# Patient Record
Sex: MALE | Race: WHITE | Employment: OTHER | URBAN - METROPOLITAN AREA
[De-identification: names, ages, dates, MRNs, and addresses within clinical notes are randomized per-mention and may not be internally consistent; named-entity substitution may affect disease eponyms.]

---

## 2022-05-25 ENCOUNTER — HOSPITAL ENCOUNTER (EMERGENCY)
Age: 47
Discharge: HOME | End: 2022-05-25
Payer: COMMERCIAL

## 2022-05-25 ENCOUNTER — APPOINTMENT (OUTPATIENT)
Dept: GENERAL RADIOLOGY | Age: 47
End: 2022-05-25
Payer: COMMERCIAL

## 2022-05-25 VITALS
DIASTOLIC BLOOD PRESSURE: 87 MMHG | SYSTOLIC BLOOD PRESSURE: 123 MMHG | RESPIRATION RATE: 17 BRPM | BODY MASS INDEX: 32.14 KG/M2 | OXYGEN SATURATION: 98 % | WEIGHT: 200 LBS | HEART RATE: 88 BPM | HEIGHT: 66 IN | TEMPERATURE: 99.2 F

## 2022-05-25 DIAGNOSIS — S92.354A CLOSED NONDISPLACED FRACTURE OF FIFTH METATARSAL BONE OF RIGHT FOOT, INITIAL ENCOUNTER: Primary | ICD-10-CM

## 2022-05-25 PROCEDURE — 99283 EMERGENCY DEPT VISIT LOW MDM: CPT

## 2022-05-25 PROCEDURE — 73630 X-RAY EXAM OF FOOT: CPT

## 2022-05-25 RX ORDER — IBUPROFEN 600 MG/1
600 TABLET ORAL EVERY 8 HOURS PRN
Qty: 20 TABLET | Refills: 0 | Status: SHIPPED | OUTPATIENT
Start: 2022-05-25

## 2022-05-25 ASSESSMENT — PAIN DESCRIPTION - LOCATION: LOCATION: FOOT

## 2022-05-25 ASSESSMENT — PAIN - FUNCTIONAL ASSESSMENT
PAIN_FUNCTIONAL_ASSESSMENT: 0-10
PAIN_FUNCTIONAL_ASSESSMENT: NONE - DENIES PAIN

## 2022-05-25 ASSESSMENT — ENCOUNTER SYMPTOMS
ABDOMINAL PAIN: 0
SHORTNESS OF BREATH: 0
COUGH: 0
BACK PAIN: 0

## 2022-05-25 ASSESSMENT — PAIN DESCRIPTION - PAIN TYPE: TYPE: ACUTE PAIN

## 2022-05-25 ASSESSMENT — PAIN DESCRIPTION - DESCRIPTORS: DESCRIPTORS: ACHING

## 2022-05-25 ASSESSMENT — PAIN SCALES - GENERAL: PAINLEVEL_OUTOF10: 5

## 2022-05-25 ASSESSMENT — PAIN DESCRIPTION - ORIENTATION: ORIENTATION: RIGHT

## 2022-05-25 NOTE — ED NOTES
Walking boot to pt's rt foot, pt gibran. Well, 0 numbness, 0 tingling. Per gordon brennan ok to d/c pt home.      Denae Montoya RN  05/25/22 0913

## 2022-05-25 NOTE — ED PROVIDER NOTES
[facility] ED  eMERGENCY dEPARTMENT eNCOUnter      Pt Name: Kishor Clemens  MRN: [id number]  [place] 1975  Date of evaluation: 5/25/2022  Provider: OLIVIA Gallegos CNP      HISTORY OF PRESENT ILLNESS    Kishor Clemens is a 41 y.o. male who presents to the Emergency Department with R foot pain after inverting his foot on Monday while walking. Moderate pain continues. He is able to ambulate without difficulty. REVIEW OF SYSTEMS       Review of Systems   Constitutional: Negative for fever. HENT: Negative for congestion. Respiratory: Negative for cough and shortness of breath. Cardiovascular: Negative for chest pain. Gastrointestinal: Negative for abdominal pain. Genitourinary: Negative for dysuria. Musculoskeletal: Negative for arthralgias and back pain. R foot pain   Skin: Negative for rash. All other systems reviewed and are negative. PAST MEDICAL HISTORY     Past Medical History:   Diagnosis Date    Hypertension          SURGICAL HISTORY     History reviewed. No pertinent surgical history. CURRENT MEDICATIONS       Previous Medications    No medications on file       ALLERGIES     Patient has no known allergies. FAMILY HISTORY     History reviewed. No pertinent family history.        SOCIAL HISTORY       Social History     Socioeconomic History    Marital status:      Spouse name: None    Number of children: None    Years of education: None    Highest education level: None   Occupational History    None   Tobacco Use    Smoking status: Never Smoker    Smokeless tobacco: Never Used   Substance and Sexual Activity    Alcohol use: Not Currently    Drug use: Never    Sexual activity: None   Other Topics Concern    None   Social History Narrative    None     Social Determinants of Health     Financial Resource Strain:     Difficulty of Paying Living Expenses: Not on file   Food Insecurity:     Worried About Running Out of Food in the Last Year: Not on file    Ran Out of Food in the Last Year: Not on file   Transportation Needs:     Lack of Transportation (Medical): Not on file    Lack of Transportation (Non-Medical): Not on file   Physical Activity:     Days of Exercise per Week: Not on file    Minutes of Exercise per Session: Not on file   Stress:     Feeling of Stress : Not on file   Social Connections:     Frequency of Communication with Friends and Family: Not on file    Frequency of Social Gatherings with Friends and Family: Not on file    Attends Orthodox Services: Not on file    Active Member of [address] [place] [address] [employer] or Organizations: Not on file    Attends Club or Organization Meetings: Not on file    Marital Status: Not on file   Intimate Partner Violence:     Fear of Current or Ex-Partner: Not on file    Emotionally Abused: Not on file    Physically Abused: Not on file    Sexually Abused: Not on file   Housing Stability:     Unable to Pay for Housing in the Last Year: Not on file    Number of [place] in the Last Year: Not on file    Unstable Housing in the Last Year: Not on file       SCREENINGS    [place] Coma Scale  Eye Opening: Spontaneous  Best Verbal Response: Oriented  Best Motor Response: Obeys commands  [place] Coma Scale Score: 15 @FLOW([id number])@      PHYSICAL EXAM    (up to 7 for level 4, 8 or more for level 5)     ED Triage Vitals   BP Temp Temp Source Heart Rate Resp SpO2 Height Weight   05/25/22 1107 05/25/22 1114 05/25/22 1114 05/25/22 1107 05/25/22 1107 05/25/22 1107 05/25/22 1107 05/25/22 1107   123/87 99.2 °F (37.3 °C) Oral 88 17 98 % 5' 6\" (1.676 m) 200 lb (90.7 kg)       Physical Exam  Vitals and nursing note reviewed. Constitutional:       Appearance: He is well-developed. HENT:      Head: Normocephalic and atraumatic.       Right Ear: External ear normal.      Left Ear: External ear normal.   Eyes:      Conjunctiva/sclera: Conjunctivae normal.      Pupils: Pupils are equal, round, and reactive to light. Cardiovascular:      Rate and Rhythm: Normal rate and regular rhythm. Pulmonary:      Effort: Pulmonary effort is normal.      Breath sounds: Normal breath sounds. Abdominal:      General: Bowel sounds are normal. There is no distension. Palpations: Abdomen is soft. Tenderness: There is no abdominal tenderness. Musculoskeletal:         General: Normal range of motion. Cervical back: Normal range of motion and neck supple. Right foot: Normal range of motion and normal capillary refill. Swelling, tenderness and bony tenderness present. No deformity or crepitus. Normal pulse. Feet:    Skin:     General: Skin is warm and dry. Neurological:      Mental Status: He is alert and oriented to person, place, and time. Deep Tendon Reflexes: Reflexes are normal and symmetric. Psychiatric:         Judgment: Judgment normal.           All other labs were within normal range or not returned as of this dictation. EMERGENCY DEPARTMENT COURSE and DIFFERENTIALDIAGNOSIS/MDM:   Vitals:    Vitals:    05/25/22 1107 05/25/22 1114   BP: 123/87    Pulse: 88    Resp: 17    Temp:  99.2 °F (37.3 °C)   TempSrc:  Oral   SpO2: 98%    Weight: 200 lb (90.7 kg)    Height: 5' 6\" (1.676 m)             41 yr old male with R 5th metatarsal fracture. Prescription for Motrin was given to the patient. F/U with podiatry in 1-2 days. Patient verbalizes understanding. PROCEDURES:  Unless otherwise noted below, none     Procedures      FINAL IMPRESSION      1.  Closed nondisplaced fracture of fifth metatarsal bone of right foot, initial encounter          DISPOSITION/PLAN   DISPOSITION Decision To Discharge 05/25/2022 11:48:48 AM          OLIVIA Wall CNP (electronically signed)  Attending Emergency Physician     OLIVIA Wall CNP  05/25/22 6531

## 2023-01-20 PROBLEM — K76.0 HEPATIC STEATOSIS: Status: ACTIVE | Noted: 2023-01-20

## 2023-01-20 PROBLEM — R73.9 HYPERGLYCEMIA: Status: ACTIVE | Noted: 2023-01-20

## 2023-01-20 PROBLEM — I10 ESSENTIAL HYPERTENSION: Status: ACTIVE | Noted: 2023-01-20

## 2023-01-20 PROBLEM — E78.1 HYPERTRIGLYCERIDEMIA: Status: ACTIVE | Noted: 2023-01-20

## 2023-01-20 PROBLEM — R74.01 TRANSAMINASEMIA: Status: ACTIVE | Noted: 2023-01-20

## 2023-01-20 PROBLEM — M75.01 ADHESIVE CAPSULITIS OF RIGHT SHOULDER: Status: ACTIVE | Noted: 2023-01-20

## 2023-01-20 PROBLEM — N13.9 OBSTRUCTIVE UROPATHY: Status: ACTIVE | Noted: 2023-01-20

## 2023-01-20 PROBLEM — R06.83 SNORING: Status: ACTIVE | Noted: 2023-01-20

## 2023-01-20 PROBLEM — K40.90 INGUINAL HERNIA, LEFT: Status: ACTIVE | Noted: 2023-01-20

## 2023-01-20 PROBLEM — R40.0 DAYTIME SLEEPINESS: Status: ACTIVE | Noted: 2023-01-20

## 2023-01-20 PROBLEM — B35.1 ONYCHOMYCOSIS OF TOENAIL: Status: ACTIVE | Noted: 2023-01-20

## 2023-01-20 PROBLEM — E55.9 VITAMIN D DEFICIENCY: Status: ACTIVE | Noted: 2023-01-20

## 2023-01-20 PROBLEM — E66.09 NON MORBID OBESITY DUE TO EXCESS CALORIES: Status: ACTIVE | Noted: 2023-01-20

## 2023-01-20 RX ORDER — METOPROLOL SUCCINATE 25 MG/1
TABLET, EXTENDED RELEASE ORAL
COMMUNITY
Start: 2017-04-10 | End: 2023-03-07 | Stop reason: SDUPTHER

## 2023-01-20 RX ORDER — TRIAMCINOLONE ACETONIDE 1 MG/G
OINTMENT TOPICAL
COMMUNITY
Start: 2018-02-06

## 2023-01-20 RX ORDER — LOSARTAN POTASSIUM 25 MG/1
25 TABLET ORAL
COMMUNITY
Start: 2021-04-27 | End: 2023-03-07 | Stop reason: SDUPTHER

## 2023-01-20 RX ORDER — ASPIRIN 81 MG/1
TABLET ORAL
COMMUNITY
Start: 2021-04-27 | End: 2023-10-27 | Stop reason: ALTCHOICE

## 2023-01-20 RX ORDER — CHOLECALCIFEROL (VITAMIN D3) 1250 MCG
50000 TABLET ORAL
COMMUNITY
Start: 2021-04-27 | End: 2023-03-07 | Stop reason: SDUPTHER

## 2023-01-20 RX ORDER — CICLOPIROX 80 MG/ML
SOLUTION TOPICAL
COMMUNITY
Start: 2021-04-27 | End: 2024-02-01 | Stop reason: SDUPTHER

## 2023-03-07 ENCOUNTER — OFFICE VISIT (OUTPATIENT)
Dept: PRIMARY CARE | Facility: CLINIC | Age: 48
End: 2023-03-07
Payer: COMMERCIAL

## 2023-03-07 VITALS
OXYGEN SATURATION: 97 % | BODY MASS INDEX: 32.3 KG/M2 | DIASTOLIC BLOOD PRESSURE: 79 MMHG | SYSTOLIC BLOOD PRESSURE: 121 MMHG | HEIGHT: 66 IN | RESPIRATION RATE: 22 BRPM | HEART RATE: 78 BPM | WEIGHT: 201 LBS

## 2023-03-07 DIAGNOSIS — R73.9 HYPERGLYCEMIA: ICD-10-CM

## 2023-03-07 DIAGNOSIS — Z12.5 SCREENING FOR MALIGNANT NEOPLASM OF PROSTATE: ICD-10-CM

## 2023-03-07 DIAGNOSIS — E78.1 HYPERTRIGLYCERIDEMIA: ICD-10-CM

## 2023-03-07 DIAGNOSIS — E55.9 VITAMIN D DEFICIENCY: ICD-10-CM

## 2023-03-07 DIAGNOSIS — K76.0 HEPATIC STEATOSIS: ICD-10-CM

## 2023-03-07 DIAGNOSIS — R35.1 NOCTURIA: ICD-10-CM

## 2023-03-07 DIAGNOSIS — I10 ESSENTIAL HYPERTENSION: Primary | ICD-10-CM

## 2023-03-07 LAB
ALANINE AMINOTRANSFERASE (SGPT) (U/L) IN SER/PLAS: 33 U/L (ref 10–52)
ALBUMIN (G/DL) IN SER/PLAS: 4.4 G/DL (ref 3.4–5)
ALKALINE PHOSPHATASE (U/L) IN SER/PLAS: 77 U/L (ref 33–120)
ANION GAP IN SER/PLAS: 12 MMOL/L (ref 10–20)
ASPARTATE AMINOTRANSFERASE (SGOT) (U/L) IN SER/PLAS: 22 U/L (ref 9–39)
BASOPHILS (10*3/UL) IN BLOOD BY AUTOMATED COUNT: 0.02 X10E9/L (ref 0–0.1)
BASOPHILS/100 LEUKOCYTES IN BLOOD BY AUTOMATED COUNT: 0.3 % (ref 0–2)
BILIRUBIN TOTAL (MG/DL) IN SER/PLAS: 0.7 MG/DL (ref 0–1.2)
CALCIUM (MG/DL) IN SER/PLAS: 9.4 MG/DL (ref 8.6–10.3)
CARBON DIOXIDE, TOTAL (MMOL/L) IN SER/PLAS: 27 MMOL/L (ref 21–32)
CHLORIDE (MMOL/L) IN SER/PLAS: 105 MMOL/L (ref 98–107)
CHOLESTEROL (MG/DL) IN SER/PLAS: 156 MG/DL (ref 0–199)
CHOLESTEROL IN HDL (MG/DL) IN SER/PLAS: 38.8 MG/DL
CHOLESTEROL/HDL RATIO: 4
CREATININE (MG/DL) IN SER/PLAS: 0.86 MG/DL (ref 0.5–1.3)
EOSINOPHILS (10*3/UL) IN BLOOD BY AUTOMATED COUNT: 0.04 X10E9/L (ref 0–0.7)
EOSINOPHILS/100 LEUKOCYTES IN BLOOD BY AUTOMATED COUNT: 0.5 % (ref 0–6)
ERYTHROCYTE DISTRIBUTION WIDTH (RATIO) BY AUTOMATED COUNT: 12.4 % (ref 11.5–14.5)
ERYTHROCYTE MEAN CORPUSCULAR HEMOGLOBIN CONCENTRATION (G/DL) BY AUTOMATED: 33.5 G/DL (ref 32–36)
ERYTHROCYTE MEAN CORPUSCULAR VOLUME (FL) BY AUTOMATED COUNT: 89 FL (ref 80–100)
ERYTHROCYTES (10*6/UL) IN BLOOD BY AUTOMATED COUNT: 5.27 X10E12/L (ref 4.5–5.9)
GFR MALE: >90 ML/MIN/1.73M2
GLUCOSE (MG/DL) IN SER/PLAS: 92 MG/DL (ref 74–99)
HEMATOCRIT (%) IN BLOOD BY AUTOMATED COUNT: 47.1 % (ref 41–52)
HEMOGLOBIN (G/DL) IN BLOOD: 15.8 G/DL (ref 13.5–17.5)
IMMATURE GRANULOCYTES/100 LEUKOCYTES IN BLOOD BY AUTOMATED COUNT: 0.5 % (ref 0–0.9)
LDL: 84 MG/DL (ref 0–99)
LEUKOCYTES (10*3/UL) IN BLOOD BY AUTOMATED COUNT: 7.3 X10E9/L (ref 4.4–11.3)
LYMPHOCYTES (10*3/UL) IN BLOOD BY AUTOMATED COUNT: 2.11 X10E9/L (ref 1.2–4.8)
LYMPHOCYTES/100 LEUKOCYTES IN BLOOD BY AUTOMATED COUNT: 29 % (ref 13–44)
MONOCYTES (10*3/UL) IN BLOOD BY AUTOMATED COUNT: 0.45 X10E9/L (ref 0.1–1)
MONOCYTES/100 LEUKOCYTES IN BLOOD BY AUTOMATED COUNT: 6.2 % (ref 2–10)
NEUTROPHILS (10*3/UL) IN BLOOD BY AUTOMATED COUNT: 4.62 X10E9/L (ref 1.2–7.7)
NEUTROPHILS/100 LEUKOCYTES IN BLOOD BY AUTOMATED COUNT: 63.5 % (ref 40–80)
PLATELETS (10*3/UL) IN BLOOD AUTOMATED COUNT: 256 X10E9/L (ref 150–450)
POTASSIUM (MMOL/L) IN SER/PLAS: 4.4 MMOL/L (ref 3.5–5.3)
PROTEIN TOTAL: 7.1 G/DL (ref 6.4–8.2)
SODIUM (MMOL/L) IN SER/PLAS: 140 MMOL/L (ref 136–145)
THYROTROPIN (MIU/L) IN SER/PLAS BY DETECTION LIMIT <= 0.05 MIU/L: 0.89 MIU/L (ref 0.44–3.98)
TRIGLYCERIDE (MG/DL) IN SER/PLAS: 166 MG/DL (ref 0–149)
UREA NITROGEN (MG/DL) IN SER/PLAS: 16 MG/DL (ref 6–23)
VLDL: 33 MG/DL (ref 0–40)

## 2023-03-07 PROCEDURE — 3078F DIAST BP <80 MM HG: CPT | Performed by: INTERNAL MEDICINE

## 2023-03-07 PROCEDURE — 3074F SYST BP LT 130 MM HG: CPT | Performed by: INTERNAL MEDICINE

## 2023-03-07 PROCEDURE — 85025 COMPLETE CBC W/AUTO DIFF WBC: CPT

## 2023-03-07 PROCEDURE — 80053 COMPREHEN METABOLIC PANEL: CPT

## 2023-03-07 PROCEDURE — 99213 OFFICE O/P EST LOW 20 MIN: CPT | Performed by: INTERNAL MEDICINE

## 2023-03-07 PROCEDURE — 84443 ASSAY THYROID STIM HORMONE: CPT

## 2023-03-07 PROCEDURE — 80061 LIPID PANEL: CPT

## 2023-03-07 RX ORDER — LOSARTAN POTASSIUM 25 MG/1
25 TABLET ORAL
Qty: 90 TABLET | Refills: 1 | Status: SHIPPED | OUTPATIENT
Start: 2023-03-07 | End: 2023-09-14 | Stop reason: SDUPTHER

## 2023-03-07 RX ORDER — METOPROLOL SUCCINATE 25 MG/1
12.5 TABLET, EXTENDED RELEASE ORAL
Qty: 45 TABLET | Refills: 3 | Status: SHIPPED | OUTPATIENT
Start: 2023-03-07 | End: 2023-05-23 | Stop reason: SDUPTHER

## 2023-03-07 RX ORDER — CHOLECALCIFEROL (VITAMIN D3) 1250 MCG
50000 TABLET ORAL
Qty: 13 TABLET | Refills: 3 | Status: SHIPPED | OUTPATIENT
Start: 2023-03-07 | End: 2023-06-05

## 2023-03-07 NOTE — PROGRESS NOTES
"Subjective   Patient ID: Arie Millan is a 47 y.o. male who presents for Follow-up (Patient is here for routine follow up.).    HPI     Review of Systems    Objective   /79 (BP Location: Right arm, Patient Position: Sitting, BP Cuff Size: Adult)   Pulse 78   Resp 22   Ht 1.676 m (5' 6\")   Wt 91.2 kg (201 lb)   SpO2 97%   BMI 32.44 kg/m²     Physical Exam    Assessment/Plan   Problem List Items Addressed This Visit          Circulatory    Essential hypertension - Primary    Relevant Orders    Comprehensive Metabolic Panel    Lipid Panel    TSH with reflex to Free T4 if abnormal    CBC and Auto Differential    Follow Up In Primary Care       Digestive    Hepatic steatosis    Relevant Orders    Comprehensive Metabolic Panel    Lipid Panel    TSH with reflex to Free T4 if abnormal    CBC and Auto Differential    Follow Up In Primary Care       Endocrine/Metabolic    Vitamin D deficiency    Relevant Orders    Follow Up In Primary Care       Other    Hyperglycemia    Relevant Orders    Comprehensive Metabolic Panel    Lipid Panel    TSH with reflex to Free T4 if abnormal    Follow Up In Primary Care    Hypertriglyceridemia    Relevant Orders    Comprehensive Metabolic Panel    Lipid Panel    TSH with reflex to Free T4 if abnormal    Follow Up In Primary Care         "

## 2023-03-07 NOTE — PATIENT INSTRUCTIONS
Thank you very much for coming.  I am very happy to see you.    Your blood pressure appears controlled.  Your laboratory examinations included your marker for diabetes, hemoglobin A1c, completely normal, 5.3.    You do have a history of elevated liver enzymes, a sign of mild strain.  An anatomical study of your liver showed some fatty infiltration.  The treatment for this would be to recheck your liver function, cholesterol panel, and consider medication to decrease your cholesterol stored in the liver.    FASTING laboratory examinations anytime soon.  I will call you with results and possible changes.    In the meantime, especially with history of weight gain, and with your body mass index 32, it will be best for us to consider options regarding weight management.  Still, you do have time to maximize DIET.  [place] diet book, DASH diet for ideas.  Consider joining [employer] for accountability!    Likewise, please maximize exercise regimens.  I do appreciate that you are a gym member.  Please make sure that you are getting your money's worth!    Again, fasting laboratory examinations soon.  Let me call you with results and possible changes.  Maximize diet and exercise.  Return in 6 months.  Call sooner please with any questions or concerns.    Please continue to drink lots of fluids, and please continue to avoid salt.  Good for blood pressure control, and good for keeping your kidneys healthy.    If you have any lingering nausea or loss of appetite, please let me know.  This may be a sign of liver strain.  Avoid TYLENOL/acetaminophen, mainly metabolized in your liver.  Avoid alcohol intake if possible.  Limit yourself to 1-2 drinks socially.    Again, thank you very much for coming.  Please continue to take care of yourself and your family, and please continue to pray for our recovery from this pandemic.  I hope you have a blessed Lenten season and a happy Easter!            0  Return in 6 months.  20  minutes please.  Consider repeat fasting laboratory examination depending on latest results.  Reassess options regarding fatty liver, hypertriglyceridemia, weight management.  Reassess hemodynamics, cardiovascular risk, preventive strategies.            0

## 2023-03-07 NOTE — PROGRESS NOTES
"Subjective   Patient ID: Arie Millan is a 47 y.o. male who presents for Follow-up (Patient is here for routine follow up.).    HPI     Review of Systems    Objective   /79 (BP Location: Right arm, Patient Position: Sitting, BP Cuff Size: Adult)   Pulse 78   Resp 22   Ht 1.676 m (5' 6\")   Wt 91.2 kg (201 lb)   SpO2 97%   BMI 32.44 kg/m²     Physical Exam    Assessment/Plan   {Assess/PlanSmartLinks:41162}       "

## 2023-05-23 DIAGNOSIS — I10 ESSENTIAL HYPERTENSION: ICD-10-CM

## 2023-05-24 RX ORDER — METOPROLOL SUCCINATE 25 MG/1
12.5 TABLET, EXTENDED RELEASE ORAL
Qty: 45 TABLET | Refills: 1 | Status: SHIPPED | OUTPATIENT
Start: 2023-05-24 | End: 2023-08-22 | Stop reason: SDUPTHER

## 2023-08-22 DIAGNOSIS — I10 ESSENTIAL HYPERTENSION: ICD-10-CM

## 2023-08-22 RX ORDER — METOPROLOL SUCCINATE 25 MG/1
12.5 TABLET, EXTENDED RELEASE ORAL
Qty: 45 TABLET | Refills: 3 | Status: SHIPPED | OUTPATIENT
Start: 2023-08-22 | End: 2024-02-01 | Stop reason: DRUGHIGH

## 2023-09-14 DIAGNOSIS — I10 ESSENTIAL HYPERTENSION: ICD-10-CM

## 2023-09-14 RX ORDER — LOSARTAN POTASSIUM 25 MG/1
25 TABLET ORAL
Qty: 90 TABLET | Refills: 0 | Status: SHIPPED | OUTPATIENT
Start: 2023-09-14 | End: 2023-10-27 | Stop reason: ALTCHOICE

## 2023-10-27 ENCOUNTER — OFFICE VISIT (OUTPATIENT)
Dept: PRIMARY CARE | Facility: CLINIC | Age: 48
End: 2023-10-27
Payer: COMMERCIAL

## 2023-10-27 VITALS
DIASTOLIC BLOOD PRESSURE: 86 MMHG | SYSTOLIC BLOOD PRESSURE: 128 MMHG | BODY MASS INDEX: 33.16 KG/M2 | WEIGHT: 206.3 LBS | OXYGEN SATURATION: 95 % | RESPIRATION RATE: 20 BRPM | HEIGHT: 66 IN | HEART RATE: 79 BPM

## 2023-10-27 DIAGNOSIS — E78.2 HYPERCHOLESTEROLEMIA WITH HYPERTRIGLYCERIDEMIA: ICD-10-CM

## 2023-10-27 DIAGNOSIS — Z12.11 ENCOUNTER FOR SCREENING FOR MALIGNANT NEOPLASM OF COLON: ICD-10-CM

## 2023-10-27 DIAGNOSIS — Z91.89 FRAMINGHAM CARDIAC RISK <10% IN NEXT 10 YEARS: ICD-10-CM

## 2023-10-27 DIAGNOSIS — I10 ESSENTIAL HYPERTENSION: Primary | ICD-10-CM

## 2023-10-27 DIAGNOSIS — K21.9 GASTROESOPHAGEAL REFLUX DISEASE, UNSPECIFIED WHETHER ESOPHAGITIS PRESENT: ICD-10-CM

## 2023-10-27 DIAGNOSIS — Z28.21 INFLUENZA VACCINATION DECLINED BY PATIENT: ICD-10-CM

## 2023-10-27 DIAGNOSIS — E66.09 CLASS 1 OBESITY DUE TO EXCESS CALORIES WITH SERIOUS COMORBIDITY AND BODY MASS INDEX (BMI) OF 33.0 TO 33.9 IN ADULT: ICD-10-CM

## 2023-10-27 DIAGNOSIS — E55.9 VITAMIN D DEFICIENCY: ICD-10-CM

## 2023-10-27 DIAGNOSIS — R73.9 HYPERGLYCEMIA: ICD-10-CM

## 2023-10-27 PROBLEM — E66.811 CLASS 1 OBESITY DUE TO EXCESS CALORIES WITH SERIOUS COMORBIDITY AND BODY MASS INDEX (BMI) OF 33.0 TO 33.9 IN ADULT: Status: ACTIVE | Noted: 2023-01-20

## 2023-10-27 PROCEDURE — 3079F DIAST BP 80-89 MM HG: CPT | Performed by: INTERNAL MEDICINE

## 2023-10-27 PROCEDURE — 3074F SYST BP LT 130 MM HG: CPT | Performed by: INTERNAL MEDICINE

## 2023-10-27 PROCEDURE — 99214 OFFICE O/P EST MOD 30 MIN: CPT | Performed by: INTERNAL MEDICINE

## 2023-10-27 PROCEDURE — 3008F BODY MASS INDEX DOCD: CPT | Performed by: INTERNAL MEDICINE

## 2023-10-27 RX ORDER — ASPIRIN 325 MG
TABLET, DELAYED RELEASE (ENTERIC COATED) ORAL
COMMUNITY
Start: 2023-10-21 | End: 2024-01-30

## 2023-10-27 NOTE — PROGRESS NOTES
"Subjective   Patient ID: Arie Millan is a 48 y.o. male who presents for Follow-up.    HPI   Somewhat lost to follow-up.  Here for reevaluation of hemodynamics.  Has been managing with METOPROLOL, systolic blood pressure 120s, hemodynamically asymptomatic.  Had not been taking LOSARTAN.  Has been trying to watch what he eats, and has been staying physically active.  No zoe chest pain.  No orthopnea, no paroxysmal nocturnal dyspnea.  No dizziness, diaphoresis, palpitations.  No loss of consciousness.  No bipedal edema.  No remarkable dyspnea on exertion.  No headache, blurred vision, diplopia.  No dysphagia.  No focal weakness, ataxia, clumsiness.  No falls.  No paresthesia.  No confusion or delirium, with patient not worried about memory.  Not depressive or suicidal, with patient not wishing harm to self or others.    Interested in possible weight loss regimens.  Inspired by his wife, who has been taking care of herself.  Again, planning to eat more sensibly.  Open to the possibility of taking medicine to help him curb his appetite.  No abdominal distress.  No dysuria, flank, suprapubic pain.  No skin changes.  ENDOCRINE with no polyuria, polydipsia, polyphagia.  No blurred vision.  No skin, hair, nail changes.  No dramatic weight loss or weight gain.      Respectfully declining INFLUENZA vaccination.  States that he has been managing without.  No particular coughing, no particular sputum production.  CONSTITUTIONALLY, no fever, no chills.  No night sweats.  No lingering anorexia or nausea.  No apparent lymphadenopathy.  No apparent weight loss.        Review of Systems  Review of systems as in history of present illness, and otherwise, reviewed separately as well, and was unremarkable/negative/noncontributory.          Objective   /86 (BP Location: Right arm, Patient Position: Sitting, BP Cuff Size: Adult)   Pulse 79   Resp 20   Ht 1.676 m (5' 6\")   Wt 93.6 kg (206 lb 4.8 oz)   SpO2 95%   BMI " 33.30 kg/m²     Physical Exam  In very good spirits.  Not in distress or diaphoresis.  Alert, oriented x3.  Amiable.  Not unkempt.  Receptive, cheerful, appropriate.  Moderately obese build.  Eager to get better.  Not wishing harm to self or others.    HEAD pink palpebral conjunctivae, anicteric sclerae.  NECK supple, no apparent jugular venous distention.  No carotid bruit.  CARDIOVASCULAR not in distress or diaphoresis.  No bipedal edema.  Regular rate and rhythm.  No murmurs appreciated.  LUNGS not in distress or diaphoresis.  Not using accessory muscles.  Clear to auscultation bilaterally.  ABDOMEN soft, nontender.  BACK no costovertebral angle tenderness.  EXTREMITIES no clubbing, no cyanosis.  NEURO no facial asymmetry.  No apparent cranial nerve deficits.  Romberg negative.  Ambulating without need of assistance.  No apparent focal weakness.  No tremors.  PSYCH receptive, appropriate, and eager to maintain and improve quality of life.      LABORATORY examination needing updating, reviewed with patient.  Assessment/Plan   Problem List Items Addressed This Visit             ICD-10-CM    Class 1 obesity due to excess calories with serious comorbidity and body mass index (BMI) of 33.0 to 33.9 in adult E66.09, Z68.33    Relevant Orders    Follow Up In Primary Care - Established    Comprehensive Metabolic Panel    Essential hypertension - Primary I10    Relevant Orders    Follow Up In Primary Care - Established    CBC and Auto Differential    Urinalysis with Reflex Microscopic and Culture    Comprehensive Metabolic Panel    Magnesium    TSH with reflex to Free T4 if abnormal    [place] cardiac risk <10% in next 10 years Z91.89    Relevant Orders    Follow Up In Primary Care - Established    Comprehensive Metabolic Panel    Lipid Panel    Gastroesophageal reflux disease K21.9    Relevant Orders    Referral to Gastroenterology    Follow Up In Primary Care - Established    CBC and Auto Differential    Urinalysis  with Reflex Microscopic and Culture    Hypercholesterolemia with hypertriglyceridemia E78.2    Relevant Orders    Follow Up In Primary Care - Established    Comprehensive Metabolic Panel    Lipid Panel    Hyperglycemia R73.9    Relevant Orders    Follow Up In Primary Care - Established    Urinalysis with Reflex Microscopic and Culture    Comprehensive Metabolic Panel    Hemoglobin A1C    Influenza vaccination declined by patient Z28.21    Relevant Orders    Follow Up In Primary Care - Established    Vitamin D deficiency E55.9    Relevant Orders    Follow Up In Primary Care - Established    Vitamin D 25-Hydroxy,Total (for eval of Vitamin D levels)     Other Visit Diagnoses         Codes    Encounter for screening for malignant neoplasm of colon     Z12.11    Relevant Orders    Referral to Gastroenterology    Follow Up In Primary Care - Established    CBC and Auto Differential             Thank you very much for coming.  I am very happy to see you.    I do understand that your blood pressure seems to be better with only METOPROLOL SUCCINATE.  I am glad to hear this.  Right now, your blood pressure seems to be reasonably controlled.    Please drink lots of fluids throughout the day, and please avoid salt.  Very good for blood pressure control, and very good for keeping your kidneys healthy.  Please step up physical activity, especially AEROBIC activities.  Brisk walking, cycling, swimming, at least 20 minutes a day total, outside of work, every day, including Sunday!    Please check your blood pressure every evening, in a calm and resting state.  Stay seated, feet flat on the floor, use the backrest of the chair.  Please check your blood pressure and call me if there is persistently elevated blood pressure, if the first number is 140 or higher, or if the second number is 85 or higher.  Likewise, call me if the heart rate at rest is 90 or higher.    Please come back in 2 months, and time for repeat FASTING laboratory  examinations that you can do at [facility], then let us do an EKG and a physical examination, so that we can determine if it will be safe for you to take PHENTERMINE/ADIPEX to help your efforts regarding weight management!    Until then, eat sensibly, [place] diet book, DASH diet for ideas.  Try to avoid acidic food like onions, garlic, tomatoes, salsa, as well as black coffee and dark chocolate.  Likewise, do not lay down until 4 hours after your last meal.  Taking PEPCID COMPLETE once in a while is okay, but if your symptoms recur and persist, please let me know, and I will review your options with you.    You are due for a COLONOSCOPY.  Go ahead and have GASTROENTEROLOGY check you out!    I do understand that you would rather not get vaccinated for INFLUENZA or any other vaccination at this time.  Just be careful, especially if there is an outbreak of influenza or COVID.  Remember that when you wear a mask, you are protecting other people, but if they are not wearing masks, they are not protecting you.  Do not go into crowds where people are not wearing masks.  Practice social distancing when necessary.    Please restart taking VITAMIN D once a week, Sundays, with lunch and a full glass of water.  Very important.    Again, thank you very much for coming.  It is nice to see you.  Please continue to take care of yourself, and please call me with blood pressure excursions.  Please come back in 2 months, and time for your physical examination for the year, during which time we will discuss options regarding weight management.  Do some FASTING lab work examinations care of [facility] a few days prior.    Please continue to take care of yourself and your family, and please continue to pray for our recovery from this pandemic.  See you in 2 months.            0  Return in 2 months.  40 minutes please if possible.  COMPLETE physical examination, twelve-lead EKG.  Review fasting laboratory examinations via  [facility].  Consider phentermine/Adipex.  Review preventive strategies, cardiovascular risk.  Possibly coordinate with GI.            0

## 2023-10-27 NOTE — PATIENT INSTRUCTIONS
Thank you very much for coming.  I am very happy to see you.    I do understand that your blood pressure seems to be better with only METOPROLOL SUCCINATE.  I am glad to hear this.  Right now, your blood pressure seems to be reasonably controlled.    Please drink lots of fluids throughout the day, and please avoid salt.  Very good for blood pressure control, and very good for keeping your kidneys healthy.  Please step up physical activity, especially AEROBIC activities.  Brisk walking, cycling, swimming, at least 20 minutes a day total, outside of work, every day, including Sunday!    Please check your blood pressure every evening, in a calm and resting state.  Stay seated, feet flat on the floor, use the backrest of the chair.  Please check your blood pressure and call me if there is persistently elevated blood pressure, if the first number is 140 or higher, or if the second number is 85 or higher.  Likewise, call me if the heart rate at rest is 90 or higher.    Please come back in 2 months, and time for repeat FASTING laboratory examinations that you can do at [facility], then let us do an EKG and a physical examination, so that we can determine if it will be safe for you to take PHENTERMINE/ADIPEX to help your efforts regarding weight management!    Until then, eat sensibly, [place] diet book, DASH diet for ideas.  Try to avoid acidic food like onions, garlic, tomatoes, salsa, as well as black coffee and dark chocolate.  Likewise, do not lay down until 4 hours after your last meal.  Taking PEPCID COMPLETE once in a while is okay, but if your symptoms recur and persist, please let me know, and I will review your options with you.    You are due for a COLONOSCOPY.  Go ahead and have GASTROENTEROLOGY check you out!    I do understand that you would rather not get vaccinated for INFLUENZA or any other vaccination at this time.  Just be careful, especially if there is an outbreak of influenza or COVID.   Remember that when you wear a mask, you are protecting other people, but if they are not wearing masks, they are not protecting you.  Do not go into crowds where people are not wearing masks.  Practice social distancing when necessary.    Please restart taking VITAMIN D once a week, Sundays, with lunch and a full glass of water.  Very important.    Again, thank you very much for coming.  It is nice to see you.  Please continue to take care of yourself, and please call me with blood pressure excursions.  Please come back in 2 months, and time for your physical examination for the year, during which time we will discuss options regarding weight management.  Do some FASTING lab work examinations care of [facility] a few days prior.    Please continue to take care of yourself and your family, and please continue to pray for our recovery from this pandemic.  See you in 2 months.            0  Return in 2 months.  40 minutes please if possible.  COMPLETE physical examination, twelve-lead EKG.  Review fasting laboratory examinations via [facility].  Consider phentermine/Adipex.  Review preventive strategies, cardiovascular risk.  Possibly coordinate with GI.            0

## 2023-10-27 NOTE — ADDENDUM NOTE
Addended by: CARMINE BEACH on: 10/27/2023 12:14 PM     Modules accepted: Orders, Level of Service

## 2024-01-08 ENCOUNTER — APPOINTMENT (OUTPATIENT)
Dept: PRIMARY CARE | Facility: CLINIC | Age: 49
End: 2024-01-08
Payer: COMMERCIAL

## 2024-01-25 DIAGNOSIS — E55.9 VITAMIN D DEFICIENCY, UNSPECIFIED: ICD-10-CM

## 2024-01-29 ENCOUNTER — LAB (OUTPATIENT)
Dept: LAB | Facility: LAB | Age: 49
End: 2024-01-29
Payer: COMMERCIAL

## 2024-01-29 DIAGNOSIS — R73.9 HYPERGLYCEMIA: ICD-10-CM

## 2024-01-29 DIAGNOSIS — E55.9 VITAMIN D DEFICIENCY: ICD-10-CM

## 2024-01-29 DIAGNOSIS — E78.2 HYPERCHOLESTEROLEMIA WITH HYPERTRIGLYCERIDEMIA: ICD-10-CM

## 2024-01-29 DIAGNOSIS — Z12.11 ENCOUNTER FOR SCREENING FOR MALIGNANT NEOPLASM OF COLON: ICD-10-CM

## 2024-01-29 DIAGNOSIS — E66.09 CLASS 1 OBESITY DUE TO EXCESS CALORIES WITH SERIOUS COMORBIDITY AND BODY MASS INDEX (BMI) OF 33.0 TO 33.9 IN ADULT: ICD-10-CM

## 2024-01-29 DIAGNOSIS — I10 ESSENTIAL HYPERTENSION: ICD-10-CM

## 2024-01-29 DIAGNOSIS — K21.9 GASTROESOPHAGEAL REFLUX DISEASE, UNSPECIFIED WHETHER ESOPHAGITIS PRESENT: ICD-10-CM

## 2024-01-29 DIAGNOSIS — Z91.89 FRAMINGHAM CARDIAC RISK <10% IN NEXT 10 YEARS: ICD-10-CM

## 2024-01-29 LAB
25(OH)D3 SERPL-MCNC: 30 NG/ML (ref 30–100)
ALBUMIN SERPL BCP-MCNC: 4.5 G/DL (ref 3.4–5)
ALP SERPL-CCNC: 65 U/L (ref 33–120)
ALT SERPL W P-5'-P-CCNC: 25 U/L (ref 10–52)
ANION GAP SERPL CALC-SCNC: 12 MMOL/L (ref 10–20)
APPEARANCE UR: CLEAR
AST SERPL W P-5'-P-CCNC: 16 U/L (ref 9–39)
BASOPHILS # BLD AUTO: 0.02 X10*3/UL (ref 0–0.1)
BASOPHILS NFR BLD AUTO: 0.2 %
BILIRUB SERPL-MCNC: 0.7 MG/DL (ref 0–1.2)
BILIRUB UR STRIP.AUTO-MCNC: NEGATIVE MG/DL
BUN SERPL-MCNC: 16 MG/DL (ref 6–23)
CALCIUM SERPL-MCNC: 9.1 MG/DL (ref 8.6–10.3)
CHLORIDE SERPL-SCNC: 106 MMOL/L (ref 98–107)
CHOLEST SERPL-MCNC: 136 MG/DL (ref 0–199)
CHOLESTEROL/HDL RATIO: 3.6
CO2 SERPL-SCNC: 28 MMOL/L (ref 21–32)
COLOR UR: YELLOW
CREAT SERPL-MCNC: 0.92 MG/DL (ref 0.5–1.3)
EGFRCR SERPLBLD CKD-EPI 2021: >90 ML/MIN/1.73M*2
EOSINOPHIL # BLD AUTO: 0.03 X10*3/UL (ref 0–0.7)
EOSINOPHIL NFR BLD AUTO: 0.4 %
ERYTHROCYTE [DISTWIDTH] IN BLOOD BY AUTOMATED COUNT: 12.9 % (ref 11.5–14.5)
EST. AVERAGE GLUCOSE BLD GHB EST-MCNC: 100 MG/DL
GLUCOSE SERPL-MCNC: 104 MG/DL (ref 74–99)
GLUCOSE UR STRIP.AUTO-MCNC: NEGATIVE MG/DL
HBA1C MFR BLD: 5.1 %
HCT VFR BLD AUTO: 46 % (ref 41–52)
HDLC SERPL-MCNC: 38 MG/DL
HGB BLD-MCNC: 15.7 G/DL (ref 13.5–17.5)
HOLD SPECIMEN: NORMAL
IMM GRANULOCYTES # BLD AUTO: 0.02 X10*3/UL (ref 0–0.7)
IMM GRANULOCYTES NFR BLD AUTO: 0.2 % (ref 0–0.9)
KETONES UR STRIP.AUTO-MCNC: NEGATIVE MG/DL
LDLC SERPL CALC-MCNC: 67 MG/DL
LEUKOCYTE ESTERASE UR QL STRIP.AUTO: NEGATIVE
LYMPHOCYTES # BLD AUTO: 1.8 X10*3/UL (ref 1.2–4.8)
LYMPHOCYTES NFR BLD AUTO: 22.4 %
MAGNESIUM SERPL-MCNC: 1.99 MG/DL (ref 1.6–2.4)
MCH RBC QN AUTO: 30 PG (ref 26–34)
MCHC RBC AUTO-ENTMCNC: 34.1 G/DL (ref 32–36)
MCV RBC AUTO: 88 FL (ref 80–100)
MONOCYTES # BLD AUTO: 0.54 X10*3/UL (ref 0.1–1)
MONOCYTES NFR BLD AUTO: 6.7 %
MUCOUS THREADS #/AREA URNS AUTO: NORMAL /LPF
NEUTROPHILS # BLD AUTO: 5.63 X10*3/UL (ref 1.2–7.7)
NEUTROPHILS NFR BLD AUTO: 70.1 %
NITRITE UR QL STRIP.AUTO: NEGATIVE
NON HDL CHOLESTEROL: 98 MG/DL (ref 0–149)
NRBC BLD-RTO: 0 /100 WBCS (ref 0–0)
PH UR STRIP.AUTO: 5 [PH]
PLATELET # BLD AUTO: 245 X10*3/UL (ref 150–450)
POTASSIUM SERPL-SCNC: 4.3 MMOL/L (ref 3.5–5.3)
PROT SERPL-MCNC: 7 G/DL (ref 6.4–8.2)
PROT UR STRIP.AUTO-MCNC: NEGATIVE MG/DL
RBC # BLD AUTO: 5.24 X10*6/UL (ref 4.5–5.9)
RBC # UR STRIP.AUTO: ABNORMAL /UL
RBC #/AREA URNS AUTO: NORMAL /HPF
SODIUM SERPL-SCNC: 142 MMOL/L (ref 136–145)
SP GR UR STRIP.AUTO: 1.02
TRIGL SERPL-MCNC: 153 MG/DL (ref 0–149)
TSH SERPL-ACNC: 0.73 MIU/L (ref 0.44–3.98)
UROBILINOGEN UR STRIP.AUTO-MCNC: <2 MG/DL
VLDL: 31 MG/DL (ref 0–40)
WBC # BLD AUTO: 8 X10*3/UL (ref 4.4–11.3)
WBC #/AREA URNS AUTO: NORMAL /HPF

## 2024-01-29 PROCEDURE — 84153 ASSAY OF PSA TOTAL: CPT

## 2024-01-29 PROCEDURE — 83735 ASSAY OF MAGNESIUM: CPT

## 2024-01-29 PROCEDURE — 83036 HEMOGLOBIN GLYCOSYLATED A1C: CPT

## 2024-01-29 PROCEDURE — 81001 URINALYSIS AUTO W/SCOPE: CPT

## 2024-01-29 PROCEDURE — 82306 VITAMIN D 25 HYDROXY: CPT

## 2024-01-29 PROCEDURE — 84443 ASSAY THYROID STIM HORMONE: CPT

## 2024-01-29 PROCEDURE — 85025 COMPLETE CBC W/AUTO DIFF WBC: CPT

## 2024-01-29 PROCEDURE — 80061 LIPID PANEL: CPT

## 2024-01-29 PROCEDURE — 80053 COMPREHEN METABOLIC PANEL: CPT

## 2024-01-30 RX ORDER — ASPIRIN 325 MG
TABLET, DELAYED RELEASE (ENTERIC COATED) ORAL
Qty: 13 CAPSULE | Refills: 0 | Status: SHIPPED | OUTPATIENT
Start: 2024-01-30 | End: 2024-02-01 | Stop reason: SDUPTHER

## 2024-02-01 ENCOUNTER — OFFICE VISIT (OUTPATIENT)
Dept: PRIMARY CARE | Facility: CLINIC | Age: 49
End: 2024-02-01
Payer: COMMERCIAL

## 2024-02-01 VITALS
OXYGEN SATURATION: 97 % | HEART RATE: 94 BPM | WEIGHT: 201 LBS | SYSTOLIC BLOOD PRESSURE: 140 MMHG | RESPIRATION RATE: 20 BRPM | DIASTOLIC BLOOD PRESSURE: 82 MMHG | BODY MASS INDEX: 30.46 KG/M2 | HEIGHT: 68 IN

## 2024-02-01 DIAGNOSIS — Z28.21 INFLUENZA VACCINATION DECLINED BY PATIENT: ICD-10-CM

## 2024-02-01 DIAGNOSIS — L82.1 SEBORRHEIC KERATOSIS: ICD-10-CM

## 2024-02-01 DIAGNOSIS — R40.0 DAYTIME SLEEPINESS: ICD-10-CM

## 2024-02-01 DIAGNOSIS — E55.9 VITAMIN D DEFICIENCY: ICD-10-CM

## 2024-02-01 DIAGNOSIS — I10 ESSENTIAL HYPERTENSION: Primary | ICD-10-CM

## 2024-02-01 DIAGNOSIS — E55.9 VITAMIN D DEFICIENCY, UNSPECIFIED: ICD-10-CM

## 2024-02-01 DIAGNOSIS — B35.1 ONYCHOMYCOSIS OF TOENAIL: ICD-10-CM

## 2024-02-01 DIAGNOSIS — R73.9 HYPERGLYCEMIA: ICD-10-CM

## 2024-02-01 DIAGNOSIS — Z12.5 SCREENING FOR MALIGNANT NEOPLASM OF PROSTATE: ICD-10-CM

## 2024-02-01 DIAGNOSIS — E66.09 CLASS 1 OBESITY DUE TO EXCESS CALORIES WITH SERIOUS COMORBIDITY AND BODY MASS INDEX (BMI) OF 30.0 TO 30.9 IN ADULT: ICD-10-CM

## 2024-02-01 DIAGNOSIS — F41.9 ANXIETY: ICD-10-CM

## 2024-02-01 DIAGNOSIS — R35.1 NOCTURIA: ICD-10-CM

## 2024-02-01 DIAGNOSIS — Z91.89 FRAMINGHAM CARDIAC RISK <10% IN NEXT 10 YEARS: ICD-10-CM

## 2024-02-01 DIAGNOSIS — Z12.11 ENCOUNTER FOR SCREENING FOR MALIGNANT NEOPLASM OF COLON: ICD-10-CM

## 2024-02-01 DIAGNOSIS — E78.2 MIXED HYPERLIPIDEMIA: ICD-10-CM

## 2024-02-01 LAB — PSA SERPL-MCNC: 0.24 NG/ML

## 2024-02-01 PROCEDURE — 3008F BODY MASS INDEX DOCD: CPT | Performed by: INTERNAL MEDICINE

## 2024-02-01 PROCEDURE — 1036F TOBACCO NON-USER: CPT | Performed by: INTERNAL MEDICINE

## 2024-02-01 PROCEDURE — 3079F DIAST BP 80-89 MM HG: CPT | Performed by: INTERNAL MEDICINE

## 2024-02-01 PROCEDURE — 3077F SYST BP >= 140 MM HG: CPT | Performed by: INTERNAL MEDICINE

## 2024-02-01 PROCEDURE — 93000 ELECTROCARDIOGRAM COMPLETE: CPT | Performed by: INTERNAL MEDICINE

## 2024-02-01 PROCEDURE — 99215 OFFICE O/P EST HI 40 MIN: CPT | Performed by: INTERNAL MEDICINE

## 2024-02-01 RX ORDER — ASPIRIN 325 MG
TABLET, DELAYED RELEASE (ENTERIC COATED) ORAL
Qty: 13 CAPSULE | Refills: 1 | Status: SHIPPED | OUTPATIENT
Start: 2024-02-01 | End: 2024-05-06

## 2024-02-01 RX ORDER — CICLOPIROX 80 MG/ML
SOLUTION TOPICAL NIGHTLY
Qty: 6.6 ML | Refills: 2 | Status: SHIPPED | OUTPATIENT
Start: 2024-02-01

## 2024-02-01 RX ORDER — LOSARTAN POTASSIUM 25 MG/1
TABLET ORAL
COMMUNITY
Start: 2023-12-10 | End: 2024-02-01 | Stop reason: SDUPTHER

## 2024-02-01 RX ORDER — LOSARTAN POTASSIUM 25 MG/1
TABLET ORAL
Qty: 90 TABLET | Refills: 1 | Status: SHIPPED | OUTPATIENT
Start: 2024-02-01

## 2024-02-01 RX ORDER — METOPROLOL SUCCINATE 25 MG/1
TABLET, EXTENDED RELEASE ORAL
Qty: 45 TABLET | Refills: 3 | Status: SHIPPED | OUTPATIENT
Start: 2024-02-01

## 2024-02-01 NOTE — PROGRESS NOTES
Subjective   Patient ID: Arie Millan is a 48 y.o. male who presents for Annual Exam.    HPI   The patient is here for his COMPLETE physical examination in preparation for possibly starting phentermine/Adipex.  Compliant with medications, tolerating regimens.  States that he does have some episodes of ANXIETY, during which time he feels perhaps overwhelmed with stress, and perhaps worried about providing for his family.  Otherwise, after taking himself away from work and spending time alone, he recovers on his own.  Continues to want to improve quality of life, and does not wish harm to self or others.  No headache, blurred vision, diplopia.  No dysphagia.  No focal weakness, ataxia, clumsiness.  No falls.  Not worried about memory.  No confusion or delirium.    Mallampati 3 noted, during which time the patient did state that he does have some episodes of DAYTIME SLEEPINESS.  Otherwise, no particular cough, no particular sputum production.  No zoe chest pain.  No orthopnea, no paroxysmal nocturnal dyspnea.  No dizziness, diaphoresis, palpitations.  No loss of consciousness.  No bipedal edema.  No remarkable dyspnea on exertion.  CONSTITUTIONALLY, no fever, no chills.  No night sweats.  No lingering anorexia or nausea.  No apparent lymphadenopathy.  No apparent weight loss.    [place] family history for PROSTATE CANCER, with some episodes of rare nocturia, hesitancy.  Otherwise, again, constitutionally asymptomatic.  No dysuria, flank, suprapubic pain.  Appetite preserved, with no nausea, vomiting, abdominal distress.  No diarrhea, no constipation.  No apparent blood in stool.  No apparent weight loss.      The patient cuts his nails short because of the overgrowth, but has not been using his PENLAC.  Otherwise, no skin changes, rashes, pruritus, jaundice.  No easy bruisability.    Interested in weight loss.  ENDOCRINE with no polyuria, polydipsia, polyphagia.  No blurred vision.  No skin, hair, nail changes.  " No dramatic weight loss or weight gain.          Review of Systems  Review of systems as in history of present illness, and otherwise, reviewed separately as well, and was unremarkable/negative/noncontributory.        Objective   /82 (BP Location: Left arm, Patient Position: Sitting)   Pulse 94   Resp 20   Ht 1.727 m (5' 8\")   Wt 91.2 kg (201 lb)   SpO2 97%   BMI 30.56 kg/m²     Physical Exam  In very good spirits.  Not in distress or diaphoresis.  Alert, oriented x 3.  Amiable.  Not unkempt.  Receptive, cheerful, appropriate.  Eager to improve quality of life.  Does not wish harm to self or others.  Obese build, but completely independent and light on his feet.    HEAD Pink palpebral conjunctivae, anicteric sclerae.  No sinus tenderness.  No tragal tenderness.  Tympanic membranes intact bilaterally.  Mucous membranes moist.  No tonsillopharyngeal congestion.  No thrush.  NECK supple, with no jugular venous distention, no lymph nodes.  No masses.  No bruits.  CARDIOVASCULAR adynamic precordium, with no heaves, thrills, or murmurs.  Regular rate and rhythm.  No bilateral edema.  LUNGS not in distress or diaphoresis.  Not using accessory muscles.  Clear to auscultation bilaterally.  ABDOMEN positive bowel sounds, soft, nontender.  Liver and spleen not palpable.  Traube's space not obliterated.  No masses appreciated.  No herniation appreciated.  GENITALIA with no abnormality, no hernia noted.  Penis with no discharge, no strictures.  Testicles intact.  ANUS no breakdown, no fissuring, no bleeding.  No skin tags, no hemorrhoids.  BACK no costovertebral angle tenderness.  EXTREMITIES no clubbing, no cyanosis.  SKIN with seborrheic keratoses, but otherwise, no breakdown, bleeding, jaundice.  NEURO no cranial nerve deficits noted.  No facial asymmetry.  Romberg negative.  No tremors.  Babinski negative.  No clonus.  PSYCH receptive, appropriate.  Eager to stay healthy and independent and productive.  " Continues to want to maintain and improve quality of life.      LABORATORY results reviewed with patient.  Assessment/Plan   Diagnoses and all orders for this visit:  Essential hypertension  -     Follow Up In Primary Care - Established  -     metoprolol succinate XL (Toprol-XL) 25 mg 24 hr tablet; Please take ONLY HALF tablet on MONDAY, WEDNESDAY, and FRIDAY only, 3 times a week.  Thank you.  -     losartan (Cozaar) 25 mg tablet; Please take 1 tablet by mouth with SUPPER every evening.  Thank you.  -     Home sleep apnea test (HSAT); Future  -     Follow Up In Primary Care - Established; Future  -     ECG 12 lead (Clinic Performed)  Mixed hyperlipidemia  -     Follow Up In Primary Care - Established; Future  -     ECG 12 lead (Clinic Performed)  Hyperglycemia  -     Follow Up In Primary Care - Established; Future  -     ECG 12 lead (Clinic Performed)  [place] cardiac risk <10% in next 10 years  Comments:  2.4% October 2023  2.3% 02/24  Orders:  -     Follow Up In Primary Care - Established; Future  -     ECG 12 lead (Clinic Performed)  Class 1 obesity due to excess calories with serious comorbidity and body mass index (BMI) of 30.0 to 30.9 in adult  -     Home sleep apnea test (HSAT); Future  -     Follow Up In Primary Care - Established; Future  -     ECG 12 lead (Clinic Performed)  Daytime sleepiness  -     Home sleep apnea test (HSAT); Future  -     Follow Up In Primary Care - Established; Future  -     ECG 12 lead (Clinic Performed)  Seborrheic keratosis  -     Referral to Dermatology  -     Follow Up In Primary Care - Established; Future  Onychomycosis of toenail  -     Referral to Podiatry; Future  -     ciclopirox (Penlac) 8 % solution; Apply topically once daily at bedtime. Apply to affected nail bed(s) and adjacent skin daily. Remove with alcohol every 7 days.  -     Follow Up In Primary Care - Established; Future  Vitamin D deficiency  -     Follow Up In Primary Care - Established; Future  Nocturia  -      Prostate Spec.Ag,Screen; Future  -     Follow Up In Primary Care - Established; Future  Screening for malignant neoplasm of prostate  -     Prostate Spec.Ag,Screen; Future  -     Follow Up In Primary Care - Established; Future  Anxiety  -     Follow Up In Primary Care - Established; Future  Influenza vaccination declined by patient  -     Follow Up In Primary Care - Established; Future  Encounter for screening for malignant neoplasm of colon  -     Follow Up In Primary Care - Established; Future  Vitamin D deficiency, unspecified  -     cholecalciferol (Vitamin D-3) 50,000 unit capsule; TAKE 1 CAPSULE WEEKLY SUNDAYS, LUNCH PLEASE WITH FULL GLASS WATER. THANK YOU.  -     Follow Up In Primary Care - Established; Future       Thank you very much for coming.  I am very happy to see you.    We did your COMPLETE physical examination today.  You seem to be doing very well.  Your blood pressure was initially elevated, but after resting, it is a little better.  Since we are planning to eventually put you on medicine for weight management, let us make some adjustments.    Please restart LOSARTAN 25 mg.  Take this with SUPPER every evening.  Please decrease use of METOPROLOL 25 mg, ONLY HALF tablet on MONDAY, WEDNESDAY, and FRIDAY with supper, 3 times a week only.    Drink lots of fluids throughout the day.  Avoid salt.  Stay physically active.  Brisk walking will be best for managing blood pressure, and also to give you energy, and to help you with your mood and efforts regarding weight management.  All good stuff!    Otherwise, your EKG is okay.  Your lungs and heart, they both sounded very good.    Thank you for doing your FASTING laboratory examinations.  Your cholesterol is mildly elevated, but you do not need to be on any STATIN cholesterol medications at this time.    Again, you just have to maximize diet and exercise.  [employer] [employer] diet book, DASH diet for ideas.  You also need to increase AEROBIC activities, as  above.  Brisk walking at least 10 minutes before you start work, and at least 10 minutes after work, for a minimum of 20 minutes every day, including Sundays!    It is normal to be worried about health, and the future of your family.  If it becomes such that you cannot function, please let me know.  Sometimes, you just have to take things easily and take yourself away from the stressful situation, like you just did when you just went home.    Please come back in 3 weeks, so that we can discuss options regarding weight management!  In the meantime, if you do have SLEEP APNEA, treating this will help you with mood and energy, as well as weight management!  Let us have you do a HOME SLEEP APNEA TEST.    Let us also have you see a foot doctor or PODIATRIST.  I have reordered your PENLAC pain for your toenails.  Apply before going to bed.  After 7 applications, get some alcohol and remove all of the left over Penlac, and then start over again.    Let us get you restarted on your VITAMIN D supplementation.    I do understand that you are worried about your family history of prostate cancer, and that sometimes, you do have some hesitation, and you do have the occasional episode to get up in the middle of the night to urinate.  We will check your prostate blood examination.  I will send word regarding results and possible changes.    In the future, you will also benefit from having a COLONOSCOPY.  I will remind you.    This does look like a lot, but just follow the outline!  I will review all of these again with you when you return in about 3 weeks.    Until then, please continue to take care of yourself and your family, and please continue to pray for our recovery from this pandemic.            0  Return in 3 weeks.  20 minutes please.  Reassess hemodynamics, cardiovascular risk, mood, energy, function.  Consider resuming regular metoprolol if patient is to start phentermine/Adipex.  CSA paperwork.  Coordinate with  dermatology, podiatry.  Consider referral to GI.  Reassess compliance, tolerance, mood, energy, function, preventive strategies, cardiovascular risk.            0

## 2024-02-01 NOTE — PATIENT INSTRUCTIONS
Thank you very much for coming.  I am very happy to see you.    We did your COMPLETE physical examination today.  You seem to be doing very well.  Your blood pressure was initially elevated, but after resting, it is a little better.  Since we are planning to eventually put you on medicine for weight management, let us make some adjustments.    Please restart LOSARTAN 25 mg.  Take this with SUPPER every evening.  Please decrease use of METOPROLOL 25 mg, ONLY HALF tablet on MONDAY, WEDNESDAY, and FRIDAY with supper, 3 times a week only.    Drink lots of fluids throughout the day.  Avoid salt.  Stay physically active.  Brisk walking will be best for managing blood pressure, and also to give you energy, and to help you with your mood and efforts regarding weight management.  All good stuff!    Otherwise, your EKG is okay.  Your lungs and heart, they both sounded very good.    Thank you for doing your FASTING laboratory examinations.  Your cholesterol is mildly elevated, but you do not need to be on any STATIN cholesterol medications at this time.    Again, you just have to maximize diet and exercise.  [place] diet book, DASH diet for ideas.  You also need to increase AEROBIC activities, as above.  Brisk walking at least 10 minutes before you start work, and at least 10 minutes after work, for a minimum of 20 minutes every day, including Sundays!    It is normal to be worried about health, and the future of your family.  If it becomes such that you cannot function, please let me know.  Sometimes, you just have to take things easily and take yourself away from the stressful situation, like you just did when you just went home.    Please come back in 3 weeks, so that we can discuss options regarding weight management!  In the meantime, if you do have SLEEP APNEA, treating this will help you with mood and energy, as well as weight management!  Let us have you do a HOME SLEEP APNEA TEST.    Let us also have you see a  foot doctor or PODIATRIST.  I have reordered your PENLAC pain for your toenails.  Apply before going to bed.  After 7 applications, get some alcohol and remove all of the left over Penlac, and then start over again.    Let us get you restarted on your VITAMIN D supplementation.    I do understand that you are worried about your family history of prostate cancer, and that sometimes, you do have some hesitation, and you do have the occasional episode to get up in the middle of the night to urinate.  We will check your prostate blood examination.  I will send word regarding results and possible changes.    In the future, you will also benefit from having a COLONOSCOPY.  I will remind you.    This does look like a lot, but just follow the outline!  I will review all of these again with you when you return in about 3 weeks.    Until then, please continue to take care of yourself and your family, and please continue to pray for our recovery from this pandemic.            0  Return in 3 weeks.  20 minutes please.  Reassess hemodynamics, cardiovascular risk, mood, energy, function.  Consider resuming regular metoprolol if patient is to start phentermine/Adipex.  CSA paperwork.  Coordinate with dermatology, podiatry.  Consider referral to GI.  Reassess compliance, tolerance, mood, energy, function, preventive strategies, cardiovascular risk.            0

## 2024-02-22 ENCOUNTER — APPOINTMENT (OUTPATIENT)
Dept: PRIMARY CARE | Facility: CLINIC | Age: 49
End: 2024-02-22
Payer: COMMERCIAL

## 2024-05-03 DIAGNOSIS — E55.9 VITAMIN D DEFICIENCY, UNSPECIFIED: ICD-10-CM

## 2024-05-06 RX ORDER — ASPIRIN 325 MG
TABLET, DELAYED RELEASE (ENTERIC COATED) ORAL
Qty: 13 CAPSULE | Refills: 1 | Status: SHIPPED | OUTPATIENT
Start: 2024-05-06

## 2024-06-18 DIAGNOSIS — I10 ESSENTIAL HYPERTENSION: ICD-10-CM

## 2024-06-25 RX ORDER — LOSARTAN POTASSIUM 25 MG/1
TABLET ORAL
Qty: 30 TABLET | Refills: 0 | Status: SHIPPED | OUTPATIENT
Start: 2024-06-25

## 2024-11-07 DIAGNOSIS — I10 ESSENTIAL HYPERTENSION: ICD-10-CM

## 2024-11-07 DIAGNOSIS — E78.2 MIXED HYPERLIPIDEMIA: Primary | ICD-10-CM

## 2024-11-07 DIAGNOSIS — R73.9 HYPERGLYCEMIA: ICD-10-CM

## 2024-11-07 DIAGNOSIS — E55.9 VITAMIN D DEFICIENCY: ICD-10-CM

## 2024-11-07 RX ORDER — LOSARTAN POTASSIUM 25 MG/1
TABLET ORAL
Qty: 90 TABLET | Refills: 0 | Status: SHIPPED | OUTPATIENT
Start: 2024-11-07

## 2024-11-07 NOTE — TELEPHONE ENCOUNTER
Arie was seen today for med refill.  Diagnoses and all orders for this visit:  Mixed hyperlipidemia (Primary)  -     Comprehensive Metabolic Panel; Future  -     Lipid Panel; Future  Essential hypertension  -     losartan (Cozaar) 25 mg tablet; PLEASE TAKE 1 TABLET BY MOUTH WITH SUPPER EVERY EVENING. THANK YOU.  -     CBC and Auto Differential; Future  -     Comprehensive Metabolic Panel; Future  -     TSH with reflex to Free T4 if abnormal; Future  -     Magnesium; Future  -     Urinalysis with Reflex Microscopic; Future  Hyperglycemia  -     Comprehensive Metabolic Panel; Future  -     Hemoglobin A1C; Future  Vitamin D deficiency  -     Comprehensive Metabolic Panel; Future  -     Vitamin D 25-Hydroxy,Total (for eval of Vitamin D levels); Future

## 2024-12-09 DIAGNOSIS — I10 ESSENTIAL HYPERTENSION: ICD-10-CM

## 2024-12-10 DIAGNOSIS — I10 ESSENTIAL HYPERTENSION: ICD-10-CM

## 2024-12-11 RX ORDER — METOPROLOL SUCCINATE 25 MG/1
TABLET, EXTENDED RELEASE ORAL
Qty: 45 TABLET | Refills: 0 | Status: SHIPPED | OUTPATIENT
Start: 2024-12-11

## 2024-12-11 RX ORDER — METOPROLOL SUCCINATE 25 MG/1
TABLET, EXTENDED RELEASE ORAL
Qty: 45 TABLET | Refills: 1 | OUTPATIENT
Start: 2024-12-11

## 2024-12-23 ENCOUNTER — APPOINTMENT (OUTPATIENT)
Dept: PRIMARY CARE | Facility: CLINIC | Age: 49
End: 2024-12-23
Payer: COMMERCIAL

## 2025-01-16 ENCOUNTER — APPOINTMENT (OUTPATIENT)
Dept: PRIMARY CARE | Facility: CLINIC | Age: 50
End: 2025-01-16
Payer: COMMERCIAL

## 2025-01-29 LAB
25(OH)D3+25(OH)D2 SERPL-MCNC: 26 NG/ML (ref 30–100)
ALBUMIN SERPL-MCNC: 4.6 G/DL (ref 3.6–5.1)
ALP SERPL-CCNC: 68 U/L (ref 36–130)
ALT SERPL-CCNC: 29 U/L (ref 9–46)
ANION GAP SERPL CALCULATED.4IONS-SCNC: 10 MMOL/L (CALC) (ref 7–17)
APPEARANCE UR: CLEAR
AST SERPL-CCNC: 21 U/L (ref 10–40)
BACTERIA #/AREA URNS HPF: ABNORMAL /HPF
BASOPHILS # BLD AUTO: 21 CELLS/UL (ref 0–200)
BASOPHILS NFR BLD AUTO: 0.3 %
BILIRUB SERPL-MCNC: 0.6 MG/DL (ref 0.2–1.2)
BILIRUB UR QL STRIP: NEGATIVE
BUN SERPL-MCNC: 18 MG/DL (ref 7–25)
CALCIUM SERPL-MCNC: 9.3 MG/DL (ref 8.6–10.3)
CHLORIDE SERPL-SCNC: 105 MMOL/L (ref 98–110)
CHOLEST SERPL-MCNC: 152 MG/DL
CHOLEST/HDLC SERPL: 3.7 (CALC)
CO2 SERPL-SCNC: 25 MMOL/L (ref 20–32)
COLOR UR: YELLOW
CREAT SERPL-MCNC: 0.82 MG/DL (ref 0.6–1.29)
EGFRCR SERPLBLD CKD-EPI 2021: 109 ML/MIN/1.73M2
EOSINOPHIL # BLD AUTO: 98 CELLS/UL (ref 15–500)
EOSINOPHIL NFR BLD AUTO: 1.4 %
ERYTHROCYTE [DISTWIDTH] IN BLOOD BY AUTOMATED COUNT: 12.3 % (ref 11–15)
EST. AVERAGE GLUCOSE BLD GHB EST-MCNC: 114 MG/DL
EST. AVERAGE GLUCOSE BLD GHB EST-SCNC: 6.3 MMOL/L
GLUCOSE SERPL-MCNC: 96 MG/DL (ref 65–99)
GLUCOSE UR QL STRIP: NEGATIVE
HBA1C MFR BLD: 5.6 % OF TOTAL HGB
HCT VFR BLD AUTO: 46.6 % (ref 38.5–50)
HDLC SERPL-MCNC: 41 MG/DL
HGB BLD-MCNC: 15.9 G/DL (ref 13.2–17.1)
HGB UR QL STRIP: ABNORMAL
HYALINE CASTS #/AREA URNS LPF: ABNORMAL /LPF
KETONES UR QL STRIP: NEGATIVE
LDLC SERPL CALC-MCNC: 89 MG/DL (CALC)
LEUKOCYTE ESTERASE UR QL STRIP: NEGATIVE
LYMPHOCYTES # BLD AUTO: 2072 CELLS/UL (ref 850–3900)
LYMPHOCYTES NFR BLD AUTO: 29.6 %
MAGNESIUM SERPL-MCNC: 2.2 MG/DL (ref 1.5–2.5)
MCH RBC QN AUTO: 30 PG (ref 27–33)
MCHC RBC AUTO-ENTMCNC: 34.1 G/DL (ref 32–36)
MCV RBC AUTO: 87.9 FL (ref 80–100)
MONOCYTES # BLD AUTO: 420 CELLS/UL (ref 200–950)
MONOCYTES NFR BLD AUTO: 6 %
NEUTROPHILS # BLD AUTO: 4389 CELLS/UL (ref 1500–7800)
NEUTROPHILS NFR BLD AUTO: 62.7 %
NITRITE UR QL STRIP: NEGATIVE
NONHDLC SERPL-MCNC: 111 MG/DL (CALC)
PH UR STRIP: ABNORMAL [PH] (ref 5–8)
PLATELET # BLD AUTO: 271 THOUSAND/UL (ref 140–400)
PMV BLD REES-ECKER: 9.2 FL (ref 7.5–12.5)
POTASSIUM SERPL-SCNC: 4.3 MMOL/L (ref 3.5–5.3)
PROT SERPL-MCNC: 7.1 G/DL (ref 6.1–8.1)
PROT UR QL STRIP: NEGATIVE
RBC # BLD AUTO: 5.3 MILLION/UL (ref 4.2–5.8)
RBC #/AREA URNS HPF: ABNORMAL /HPF
SERVICE CMNT-IMP: ABNORMAL
SODIUM SERPL-SCNC: 140 MMOL/L (ref 135–146)
SP GR UR STRIP: 1.02 (ref 1–1.03)
SQUAMOUS #/AREA URNS HPF: ABNORMAL /HPF
TRIGL SERPL-MCNC: 121 MG/DL
TSH SERPL-ACNC: 0.68 MIU/L (ref 0.4–4.5)
WBC # BLD AUTO: 7 THOUSAND/UL (ref 3.8–10.8)
WBC #/AREA URNS HPF: ABNORMAL /HPF

## 2025-01-30 ENCOUNTER — APPOINTMENT (OUTPATIENT)
Dept: PRIMARY CARE | Facility: CLINIC | Age: 50
End: 2025-01-30
Payer: COMMERCIAL

## 2025-01-30 VITALS
HEART RATE: 63 BPM | BODY MASS INDEX: 31.71 KG/M2 | DIASTOLIC BLOOD PRESSURE: 76 MMHG | SYSTOLIC BLOOD PRESSURE: 109 MMHG | HEIGHT: 68 IN | OXYGEN SATURATION: 97 % | WEIGHT: 209.2 LBS

## 2025-01-30 DIAGNOSIS — Z91.89 FRAMINGHAM CARDIAC RISK <10% IN NEXT 10 YEARS: ICD-10-CM

## 2025-01-30 DIAGNOSIS — I10 ESSENTIAL HYPERTENSION: ICD-10-CM

## 2025-01-30 DIAGNOSIS — R35.1 NOCTURIA: ICD-10-CM

## 2025-01-30 DIAGNOSIS — E78.2 MIXED HYPERLIPIDEMIA: ICD-10-CM

## 2025-01-30 DIAGNOSIS — R73.9 HYPERGLYCEMIA: ICD-10-CM

## 2025-01-30 DIAGNOSIS — K76.0 HEPATIC STEATOSIS: ICD-10-CM

## 2025-01-30 DIAGNOSIS — E55.9 VITAMIN D DEFICIENCY: ICD-10-CM

## 2025-01-30 DIAGNOSIS — L60.0 INGROWN TOENAIL OF LEFT FOOT: Primary | ICD-10-CM

## 2025-01-30 DIAGNOSIS — R40.0 DAYTIME SLEEPINESS: ICD-10-CM

## 2025-01-30 DIAGNOSIS — R63.5 WEIGHT GAIN: ICD-10-CM

## 2025-01-30 DIAGNOSIS — E66.811 CLASS 1 OBESITY DUE TO EXCESS CALORIES WITH SERIOUS COMORBIDITY AND BODY MASS INDEX (BMI) OF 31.0 TO 31.9 IN ADULT: ICD-10-CM

## 2025-01-30 DIAGNOSIS — E66.09 CLASS 1 OBESITY DUE TO EXCESS CALORIES WITH SERIOUS COMORBIDITY AND BODY MASS INDEX (BMI) OF 31.0 TO 31.9 IN ADULT: ICD-10-CM

## 2025-01-30 DIAGNOSIS — L30.9 ECZEMA, UNSPECIFIED TYPE: ICD-10-CM

## 2025-01-30 RX ORDER — AMOXICILLIN AND CLAVULANATE POTASSIUM 875; 125 MG/1; MG/1
TABLET, FILM COATED ORAL
Qty: 14 TABLET | Refills: 0 | Status: SHIPPED | OUTPATIENT
Start: 2025-01-30

## 2025-01-30 RX ORDER — TRIAMCINOLONE ACETONIDE 1 MG/G
OINTMENT TOPICAL
Qty: 80 G | Refills: 0 | Status: SHIPPED | OUTPATIENT
Start: 2025-01-30

## 2025-01-30 RX ORDER — PHENTERMINE HYDROCHLORIDE 37.5 MG/1
TABLET ORAL
Qty: 30 TABLET | Refills: 0 | Status: SHIPPED | OUTPATIENT
Start: 2025-01-30

## 2025-01-30 NOTE — PROGRESS NOTES
"Subjective   Patient ID: Arie Millan is a 49 y.o. male who presents for Follow-up (Patient presented today for a 6 month follow up./).    HPI     Review of Systems    Objective   /76 (BP Location: Left arm, Patient Position: Sitting, BP Cuff Size: Adult)   Pulse 63   Ht 1.727 m (5' 8\")   Wt 94.9 kg (209 lb 3.2 oz)   SpO2 97%   BMI 31.81 kg/m²     Physical Exam    Assessment/Plan   Diagnoses and all orders for this visit:  Ingrown toenail of left foot  -     amoxicillin-pot clavulanate (Augmentin) 875-125 mg tablet; Please take 1 tablet with breakfast, and again 1 tablet with supper.  Keep doses 10 to 12 hours apart.  Thank you.  Essential hypertension  -     ECG 12 Lead  Mixed hyperlipidemia  -     phentermine (Adipex-P) 37.5 mg tablet; Please take 1 tablet by mouth with breakfast, no later.  Lots of fluids throughout the day.  Thank you.  -     ECG 12 Lead  Hyperglycemia  -     phentermine (Adipex-P) 37.5 mg tablet; Please take 1 tablet by mouth with breakfast, no later.  Lots of fluids throughout the day.  Thank you.  -     ECG 12 Lead  [place] cardiac risk <10% in next 10 years  -     phentermine (Adipex-P) 37.5 mg tablet; Please take 1 tablet by mouth with breakfast, no later.  Lots of fluids throughout the day.  Thank you.  -     ECG 12 Lead  Class 1 obesity due to excess calories with serious comorbidity and body mass index (BMI) of 31.0 to 31.9 in adult  -     phentermine (Adipex-P) 37.5 mg tablet; Please take 1 tablet by mouth with breakfast, no later.  Lots of fluids throughout the day.  Thank you.  -     ECG 12 Lead  Vitamin D deficiency  Hepatic steatosis  Nocturia  Weight gain  Daytime sleepiness  Eczema, unspecified type  -     triamcinolone (Kenalog) 0.1 % ointment; Apply thin layer to affected areas 3 times a day.  Do not cover with bandage.       Thank you very much for coming.  I am very happy to see you.  It has been a little while.  Thank you for doing your FASTING laboratory " examinations.    Your blood pressure is very good!  Because we are considering using PHENTERMINE/ADIPEX, I would recommend that you continue to take your METOPROLOL 3 times a week.  Take with supper.  Also, please continue to drink lots of fluids throughout the day and avoid salt.  Good for blood pressure control, and good for keeping your kidneys healthy.  Also, please continue staying physically active, especially with aerobic activities.  Again, good for blood pressure control, and also good for mood and energy and even weight management!    Please check your blood pressure in the evening, in a calm and resting state.  Feet flat on the floor.  Use the backrest of the chair.  Please call me if at rest your heart rate is 90 or higher.  Please call me if at rest, the first number of your blood pressure is persistently 145 or higher.  Please call me if at rest, the second number of your blood pressure is persistently 88 or higher.  I can make further adjustments over the telephone until I see you again.    Twelve-lead EKG today.  Please come back next month, so that we can review how you are doing with your current regimens.    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  You will benefit from seeing a FOOT SPECIALIST.  They will be able to use specific tools to clean out the area and treat the infection and prevent further irritation.    In the meantime, you are doing the right thing by soaking your foot at least once every evening.  This will help increase blood flow to clear the infection and also help the infection drain out through the skin if it breaks.    No more instrumentation of your foot.  Let the podiatrist take care of that.  No tight fitting shoes please.  Elevate your foot whenever possible.    Generic Augmentin antibiotic AMOXICILLIN/CLAVULANATE 875 mg.  Take this with breakfast and again with supper.  This will control the infection  until you are seen by the foot specialist.    Thank you for doing your FASTING laboratory examinations.  Your cholesterol panel is good.  Your risk for heart attack or stroke is low.  Your marker for diabetes is likewise very good.  Your vitamin D is low.  Please continue taking your vitamin D supplement.    I am glad to hear that the EDIBLES that you tried gave you energy!  Write down what is in the edibles, so that we can review these together to make sure that they are safe to take.    In the meantime, while on phentermine/Adipex, please do not use any edibles, so that we can make sure that you do not have any drug interactions.    Remember that phentermine/Adipex is a little bit like adrenaline.  It can increase blood pressure and heart rate.  It can cause insomnia.  Take with breakfast, no later.    Phentermine/Adipex can cause dry mouth and constipation, most common side effects.  Drink lots of fluids throughout the day.  Consider METAMUCIL which can also help you control your appetite and help you move your bowels more regularly.    Watch out for urine hesitancy.    Maximize DIET.  [place] diet book, DASH diet, Mediterranean diet for ideas.    Again, maximize aerobic activities as well.    I am very happy to see you.  I am eager to see how you do over the next month.  Until then, please take care of yourself, and call sooner with any questions or concerns.    With your tendency for ingrown toenail, you should update your TETANUS vaccination.  Go to your local friendly pharmacy at [facility].  Jarrod Bills will take care of you!  Ask for diphtheria/pertussis/tetanus vaccination.    Get your PHENTERMINE/ADIPEX from [facility], because [facility] will not have it.  Take with breakfast, no later.    Again, thank you very much for coming.  See you in 1 month.  I hope you had a good Ismael, and I do wish you a happy new year.  Regards to your wife.  Take care and God bless.            0  Return in 1 month.  20 minutes  please.  Phentermine/Adipex No. 2 if appropriate.  CSA paperwork if to continue use of phentermine/Adipex.  Review edibles use, possible drug interaction.  Consider colonoscopy.  Update vaccination profile.  Prioritize issues.  Coordinate with podiatry.  Reassess weight loss efforts.            0  Patient also later reminding me of his history of DAYTIME SLEEPINESS.  We will investigate this further as well, and see if phentermine/Adipex may be helping this also.            0

## 2025-01-30 NOTE — PATIENT INSTRUCTIONS
Thank you very much for coming.  I am very happy to see you.  It has been a little while.  Thank you for doing your FASTING laboratory examinations.    Your blood pressure is very good!  Because we are considering using PHENTERMINE/ADIPEX, I would recommend that you continue to take your METOPROLOL 3 times a week.  Take with supper.  Also, please continue to drink lots of fluids throughout the day and avoid salt.  Good for blood pressure control, and good for keeping your kidneys healthy.  Also, please continue staying physically active, especially with aerobic activities.  Again, good for blood pressure control, and also good for mood and energy and even weight management!    Please check your blood pressure in the evening, in a calm and resting state.  Feet flat on the floor.  Use the backrest of the chair.  Please call me if at rest your heart rate is 90 or higher.  Please call me if at rest, the first number of your blood pressure is persistently 145 or higher.  Please call me if at rest, the second number of your blood pressure is persistently 88 or higher.  I can make further adjustments over the telephone until I see you again.    Twelve-lead EKG today.  Please come back next month, so that we can review how you are doing with your current regimens.    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  You will benefit from seeing a FOOT SPECIALIST.  They will be able to use specific tools to clean out the area and treat the infection and prevent further irritation.    In the meantime, you are doing the right thing by soaking your foot at least once every evening.  This will help increase blood flow to clear the infection and also help the infection drain out through the skin if it breaks.    No more instrumentation of your foot.  Let the podiatrist take care of that.  No tight fitting shoes please.  Elevate your foot whenever possible.    Generic  Augmentin antibiotic AMOXICILLIN/CLAVULANATE 875 mg.  Take this with breakfast and again with supper.  This will control the infection until you are seen by the foot specialist.    Thank you for doing your FASTING laboratory examinations.  Your cholesterol panel is good.  Your risk for heart attack or stroke is low.  Your marker for diabetes is likewise very good.  Your vitamin D is low.  Please continue taking your vitamin D supplement.    I am glad to hear that the EDIBLES that you tried gave you energy!  Write down what is in the edibles, so that we can review these together to make sure that they are safe to take.    In the meantime, while on phentermine/Adipex, please do not use any edibles, so that we can make sure that you do not have any drug interactions.    Remember that phentermine/Adipex is a little bit like adrenaline.  It can increase blood pressure and heart rate.  It can cause insomnia.  Take with breakfast, no later.    Phentermine/Adipex can cause dry mouth and constipation, most common side effects.  Drink lots of fluids throughout the day.  Consider METAMUCIL which can also help you control your appetite and help you move your bowels more regularly.    Watch out for urine hesitancy.    Maximize DIET.  [place] diet book, DASH diet, Mediterranean diet for ideas.    Again, maximize aerobic activities as well.    I am very happy to see you.  I am eager to see how you do over the next month.  Until then, please take care of yourself, and call sooner with any questions or concerns.    With your tendency for ingrown toenail, you should update your TETANUS vaccination.  Go to your local friendly pharmacy at [facility].  Jarrod Bills will take care of you!  Ask for diphtheria/pertussis/tetanus vaccination.    Get your [facility]MINE/[facility]IPEX from [facility], because [facility] will not have it.  Take with breakfast, no later.    Again, thank you very much for coming.  See you in 1 month.  I hope you had a good Ismael,  and I do wish you a happy new year.  Regards to your wife.  Take care and God bless.            0  Return in 1 month.  20 minutes please.  Phentermine/Adipex No. 2 if appropriate.  CSA paperwork if to continue use of phentermine/Adipex.  Review edibles use, possible drug interaction.  Consider colonoscopy.  Update vaccination profile.  Prioritize issues.  Coordinate with podiatry.  Reassess weight loss efforts.            0

## 2025-02-09 DIAGNOSIS — I10 ESSENTIAL HYPERTENSION: ICD-10-CM

## 2025-02-19 RX ORDER — LOSARTAN POTASSIUM 25 MG/1
TABLET ORAL
Qty: 90 TABLET | Refills: 0 | Status: SHIPPED | OUTPATIENT
Start: 2025-02-19

## 2025-02-27 ENCOUNTER — APPOINTMENT (OUTPATIENT)
Dept: PRIMARY CARE | Facility: CLINIC | Age: 50
End: 2025-02-27
Payer: COMMERCIAL

## 2025-02-27 VITALS
DIASTOLIC BLOOD PRESSURE: 75 MMHG | HEART RATE: 65 BPM | WEIGHT: 204 LBS | SYSTOLIC BLOOD PRESSURE: 116 MMHG | OXYGEN SATURATION: 96 % | HEIGHT: 68 IN | BODY MASS INDEX: 30.92 KG/M2

## 2025-02-27 DIAGNOSIS — R73.9 HYPERGLYCEMIA: ICD-10-CM

## 2025-02-27 DIAGNOSIS — E66.811 CLASS 1 OBESITY DUE TO EXCESS CALORIES WITH SERIOUS COMORBIDITY AND BODY MASS INDEX (BMI) OF 31.0 TO 31.9 IN ADULT: Primary | ICD-10-CM

## 2025-02-27 DIAGNOSIS — Z91.89 FRAMINGHAM CARDIAC RISK <10% IN NEXT 10 YEARS: ICD-10-CM

## 2025-02-27 DIAGNOSIS — E55.9 VITAMIN D DEFICIENCY: ICD-10-CM

## 2025-02-27 DIAGNOSIS — E66.09 CLASS 1 OBESITY DUE TO EXCESS CALORIES WITH SERIOUS COMORBIDITY AND BODY MASS INDEX (BMI) OF 31.0 TO 31.9 IN ADULT: Primary | ICD-10-CM

## 2025-02-27 DIAGNOSIS — E78.2 MIXED HYPERLIPIDEMIA: ICD-10-CM

## 2025-02-27 DIAGNOSIS — I10 ESSENTIAL HYPERTENSION: ICD-10-CM

## 2025-02-27 PROCEDURE — 3078F DIAST BP <80 MM HG: CPT | Performed by: INTERNAL MEDICINE

## 2025-02-27 PROCEDURE — 3074F SYST BP LT 130 MM HG: CPT | Performed by: INTERNAL MEDICINE

## 2025-02-27 PROCEDURE — 3008F BODY MASS INDEX DOCD: CPT | Performed by: INTERNAL MEDICINE

## 2025-02-27 PROCEDURE — 99214 OFFICE O/P EST MOD 30 MIN: CPT | Performed by: INTERNAL MEDICINE

## 2025-02-27 RX ORDER — ASPIRIN 325 MG
TABLET, DELAYED RELEASE (ENTERIC COATED) ORAL
Qty: 13 CAPSULE | Refills: 1 | Status: SHIPPED | OUTPATIENT
Start: 2025-02-27

## 2025-02-27 RX ORDER — PHENTERMINE HYDROCHLORIDE 37.5 MG/1
TABLET ORAL
Qty: 30 TABLET | Refills: 0 | Status: SHIPPED | OUTPATIENT
Start: 2025-02-27

## 2025-02-27 NOTE — PROGRESS NOTES
Subjective   Patient ID: Arie Millan is a 49 y.o. male who presents for Follow-up (Patient presented today for a  month follow up to weight management./).    HPI   The patient is happy to say that with the help of PHENTERMINE/ADIPEX, he has been motivated to take care of tasks that he has been postponing, both at work, and at home.  He may have stayed up late couple of nights, but does understand that it is best for him to be protective of his sleep, because it will also help him sustain more weight loss.  CONSTITUTIONALLY, no fever, no chills.  No night sweats.  No lingering anorexia or nausea.     The patient states no bothersome symptoms, only perhaps dry mouth.  No abdominal distress, no constipation.  No palpitations, dizziness, diaphoresis.  In fact, blood pressure has been very much controlled.  No zoe substernal chest pain, no orthopnea, no paroxysmal nocturnal dyspnea.      OARRS:  No data recorded  I have personally reviewed the OARRS report for Arie Millan. I have considered the risks of abuse, dependence, addiction and diversion    Is the patient prescribed a combination of a benzodiazepine and opioid?  No    Last Urine Drug Screen / ordered today: No  No results found for this or any previous visit (from the past 8760 hours).  N/A    Clinical rationale for not completing a Urine Drug Screen: Patient prescribed only an antidiarrheal, anorexiant, or testosterone      Controlled Substance Agreement:  Date of the Last Agreement: February 27, 2025  Reviewed Controlled Substance Agreement including but not limited to the benefits, risks, and alternatives to treatment with a Controlled Substance medication(s).    Anorexiants:   What is the patient's goal of therapy?  Safe and reasonable weight loss while watching out for any potential side effects of phentermine/Adipex.  Maximizing diet and exercise as well.  Is this being achieved with current treatment?  Yes.    I have assessed the  "patient's continuing efforts to lose weight., I have assessed the patient's dedication to the treatment program and the response to treatment., and I have assessed the presence or absence of contraindications, adverse effects, and indicators of possible substance abuse that would necessitate cessation of treatment utilizing controlled substance.    Patient has demonstrated continued efforts to lose weight, is dedicated to the treatment program and the response to treatment. and I have assessed for the presence or absence of contraindications, adverse effects, and indicators of possible substance abuse that would necessitate cessation of treatment utilizing controlled substance.    Activities of Daily Living:   Is your overall impression that this patient is benefiting (symptom reduction outweighs side effects) from anorexiants therapy? Yes     1. Physical Functioning: Better  2. Family Relationship: Better  3. Social Relationship: Better  4. Mood: Better  5. Sleep Patterns: Same  6. Overall Function: Better        Review of Systems  Review of systems as in history of present illness, and otherwise, reviewed separately as well, and was unremarkable/negative/noncontributory.        Objective   /75 (BP Location: Left arm, Patient Position: Sitting, BP Cuff Size: Adult)   Pulse 65   Ht 1.727 m (5' 8\")   Wt 92.5 kg (204 lb)   SpO2 96%   BMI 31.02 kg/m²     Physical Exam  In very good spirits.  Not in distress or diaphoresis.  Alert, oriented x 3.  Amiable.  Not unkempt.  Receptive, cheerful, appropriate, and eager to maintain and hopefully improve quality of life.  Does not wish harm to self or others.  Independent, capable, appropriate.    HEAD pink palpebral conjunctivae, anicteric sclerae.  Mucous membranes somewhat dry.  NECK supple, no apparent jugular venous distention.  CARDIOVASCULAR not in distress or diaphoresis.  No bipedal edema.  Regular rate and rhythm.  No murmurs appreciated.  LUNGS not in " distress or diaphoresis.  Not using accessory muscles.  Clear to auscultation bilaterally.  ABDOMEN soft, nontender.  BACK no costovertebral angle tenderness.  EXTREMITIES no clubbing, no cyanosis.  NEURO no facial asymmetry.  No apparent cranial nerve deficits.  Romberg negative.  Ambulating without need of assistance.  No apparent focal weakness.  No tremors.  PSYCH receptive, appropriate, and eager to maintain and improve quality of life.        LABORATORY results reviewed, with hemoglobin A1c 5.6, current body mass index 31.02.  LDL cholesterol 89.  Current ASCVD risk 1.8%.  Vitamin D 26.  Liver and kidney function preserved.        Assessment/Plan   Diagnoses and all orders for this visit:  Class 1 obesity due to excess calories with serious comorbidity and body mass index (BMI) of 31.0 to 31.9 in adult  -     phentermine (Adipex-P) 37.5 mg tablet; Please take 1 tablet by mouth with breakfast, no later.  Lots of fluids throughout the day.  Thank you.  -     Follow Up In Primary Care - Established; Future  Mixed hyperlipidemia  -     phentermine (Adipex-P) 37.5 mg tablet; Please take 1 tablet by mouth with breakfast, no later.  Lots of fluids throughout the day.  Thank you.  -     Follow Up In Primary Care - Established; Future  Hyperglycemia  -     phentermine (Adipex-P) 37.5 mg tablet; Please take 1 tablet by mouth with breakfast, no later.  Lots of fluids throughout the day.  Thank you.  -     Follow Up In Primary Care - [facility]; Future  [place] cardiac risk <10% in next 10 years  -     phentermine (Adipex-P) 37.5 mg tablet; Please take 1 tablet by mouth with breakfast, no later.  Lots of fluids throughout the day.  Thank you.  -     Follow Up In Primary Care - [facility]; Future  Essential hypertension  -     Follow Up In Primary Care - [facility]; Future  Vitamin D deficiency  -     cholecalciferol (Vitamin D-3) 50,000 unit capsule; TAKE 1 CAPSULE WEEKLY SUNDAYS, LUNCH PLEASE WITH FULL GLASS WATER.  THANK YOU.       Thank you very much for coming.  I am very happy to see you!    I am very happy to hear that you are responding very nicely to PHENTERMINE/ADIPEX!  Please take advantage, and maximize physical activity.  Do lots of brisk walking, cycling, swimming, aerobic activities.  These are the best in terms of weight loss!    Likewise, please maximize diet.  Thank you for your efforts.  [place] diet book, DASH diet, Mediterranean diet for ideas.    Please make sure that you are drinking lots of fluids throughout the day.  Consider METAMUCIL if you start having slowness of your bowel movement.  Metamucil is good not only in keeping you regular and keeping your stool soft, but it also is good in helping you feel full faster.  Again, please drink lots of fluids throughout the day.    As an experiment, go ahead and weigh yourself before you go to bed.  When you wake up in the morning, go ahead and weigh yourself again.  You will have lost 1 to 2 pounds just from sleeping!  Go ahead and use the bathroom for #1 and #2.  You will have lost another 1 to 2 pounds just from bowel movement.  This will be your dry weight, and the lightest weight that you will have for the day.    Expect to lose about 5 pounds every month, more if you are able to take care of yourself.  This also includes not staying up late at night if you do not have to.  Please be protective of your sleep.  Remember that your metabolism works while you are sleeping, and potentially, you are losing weight just by sleeping!    Please come back next month.  Call sooner with questions or concerns.  Please continue to take care of yourself and your family, and please continue to pray for our recovery from this pandemic.  Take care and God bless.  I hope you have a good Fat Tuesday and a good Jordan Wednesday.            0  Return in 1 month.  20 minutes please.  Phentermine/Adipex No. 3.  Consider other options regarding weight management.  Reassess  hemodynamics, cardiovascular risk, review preventive strategies.            0  Patient tolerated phentermine/Adipex, with blood pressure controlled.  No palpitations.  Hemodynamically asymptomatic.  Completed CSA, and we plan to continue using phentermine/Adipex with breakfast.  Risks and benefits reviewed.  Patient accepting risk over potential benefits.            0

## 2025-02-27 NOTE — PATIENT INSTRUCTIONS
Thank you very much for coming.  I am very happy to see you!    I am very happy to hear that you are responding very nicely to PHENTERMINE/ADIPEX!  Please take advantage, and maximize physical activity.  Do lots of brisk walking, cycling, swimming, aerobic activities.  These are the best in terms of weight loss!    Likewise, please maximize diet.  Thank you for your efforts.  [place] diet book, DASH diet, Mediterranean diet for ideas.    Please make sure that you are drinking lots of fluids throughout the day.  Consider METAMUCIL if you start having slowness of your bowel movement.  Metamucil is good not only in keeping you regular and keeping your stool soft, but it also is good in helping you feel full faster.  Again, please drink lots of fluids throughout the day.    As an experiment, go ahead and weigh yourself before you go to bed.  When you wake up in the morning, go ahead and weigh yourself again.  You will have lost 1 to 2 pounds just from sleeping!  Go ahead and use the bathroom for #1 and #2.  You will have lost another 1 to 2 pounds just from bowel movement.  This will be your dry weight, and the lightest weight that you will have for the day.    Expect to lose about 5 pounds every month, more if you are able to take care of yourself.  This also includes not staying up late at night if you do not have to.  Please be protective of your sleep.  Remember that your metabolism works while you are sleeping, and potentially, you are losing weight just by sleeping!    Please come back next month.  Call sooner with questions or concerns.  Please continue to take care of yourself and your family, and please continue to pray for our recovery from this pandemic.  Take care and God bless.  I hope you have a good Fat Tuesday and a good Jordan Wednesday.            0  Return in 1 month.  20 minutes please.  Phentermine/Adipex No. 3.  Consider other options regarding weight management.  Reassess hemodynamics,  cardiovascular risk, review preventive strategies.            0

## 2025-03-27 ENCOUNTER — APPOINTMENT (OUTPATIENT)
Dept: PRIMARY CARE | Facility: CLINIC | Age: 50
End: 2025-03-27
Payer: COMMERCIAL

## 2025-04-07 ENCOUNTER — APPOINTMENT (OUTPATIENT)
Dept: PRIMARY CARE | Facility: CLINIC | Age: 50
End: 2025-04-07
Payer: COMMERCIAL

## 2025-04-07 VITALS
HEART RATE: 76 BPM | WEIGHT: 201 LBS | DIASTOLIC BLOOD PRESSURE: 82 MMHG | BODY MASS INDEX: 30.46 KG/M2 | SYSTOLIC BLOOD PRESSURE: 120 MMHG | HEIGHT: 68 IN | OXYGEN SATURATION: 95 %

## 2025-04-07 DIAGNOSIS — R73.9 HYPERGLYCEMIA: ICD-10-CM

## 2025-04-07 DIAGNOSIS — E66.09 CLASS 1 OBESITY DUE TO EXCESS CALORIES WITH SERIOUS COMORBIDITY AND BODY MASS INDEX (BMI) OF 30.0 TO 30.9 IN ADULT: Primary | ICD-10-CM

## 2025-04-07 DIAGNOSIS — I10 ESSENTIAL HYPERTENSION: ICD-10-CM

## 2025-04-07 DIAGNOSIS — Z12.11 SCREENING FOR COLON CANCER: ICD-10-CM

## 2025-04-07 DIAGNOSIS — L82.1 SEBORRHEIC KERATOSIS: ICD-10-CM

## 2025-04-07 DIAGNOSIS — Z91.89 FRAMINGHAM CARDIAC RISK <10% IN NEXT 10 YEARS: ICD-10-CM

## 2025-04-07 DIAGNOSIS — E66.811 CLASS 1 OBESITY DUE TO EXCESS CALORIES WITH SERIOUS COMORBIDITY AND BODY MASS INDEX (BMI) OF 30.0 TO 30.9 IN ADULT: Primary | ICD-10-CM

## 2025-04-07 DIAGNOSIS — E78.2 MIXED HYPERLIPIDEMIA: ICD-10-CM

## 2025-04-07 PROCEDURE — 3008F BODY MASS INDEX DOCD: CPT | Performed by: INTERNAL MEDICINE

## 2025-04-07 PROCEDURE — 3079F DIAST BP 80-89 MM HG: CPT | Performed by: INTERNAL MEDICINE

## 2025-04-07 PROCEDURE — 99212 OFFICE O/P EST SF 10 MIN: CPT | Performed by: INTERNAL MEDICINE

## 2025-04-07 PROCEDURE — 3074F SYST BP LT 130 MM HG: CPT | Performed by: INTERNAL MEDICINE

## 2025-04-07 RX ORDER — PHENTERMINE HYDROCHLORIDE 37.5 MG/1
TABLET ORAL
Qty: 30 TABLET | Refills: 0 | Status: SHIPPED | OUTPATIENT
Start: 2025-04-07

## 2025-04-07 NOTE — PATIENT INSTRUCTIONS
Thank you very much for coming.  I am always very happy to see you!    Your blood pressure is controlled.  Your heart rate is good.  Please continue taking your LOSARTAN as well as your METOPROLOL.  Yes, in the future, especially with weight loss, you may outgrow your blood pressure regimens!    Please continue to drink lots of fluids throughout the day, and please continue to avoid salt.  Please continue to stay physically active with lots of aerobic activities like brisk walking.  All of these contribute to better blood pressure readings!    In the meantime, you continue to slowly lose weight, average for pounds in 1 month.  This is good.  Please continue to maximize DIET.  [facility] diet book, DASH diet, Mediterranean diet for ideas.  Please continue to maximize physical activity, especially aerobic activities like BRISK WALKING.    Please do not stay up late, because your phentermine/Adipex wears out by then, and you will have a surge in your appetite!  Please continue to take your phentermine/Adipex with BREAKFAST, no later.  Please continue to drink lots of fluids throughout the day.    Please call me after 1 month for refill of your phentermine/Adipex.  Be ready to give me the latest blood pressure, heart rate, and weight that you have, so that we can update your chart.  Do the same after 2 months.    Please come back in 3 months.  Until then, please continue to take care of yourself and your family, and please continue to pray for our recovery from this pandemic.    Please check out [email or web address].  I hope you have a happy Easter!            0  Return in 3 months, but to call next month for phentermine/Adipex No. 4, and again phentermine/Adipex No. 5 another month later.  Reassess hemodynamics, cardiovascular risk, weight loss efforts, mood, energy, function, preventive strategies, cardiovascular risk.            0

## 2025-04-07 NOTE — PROGRESS NOTES
"Subjective   Patient ID: Arie Millan is a 50 y.o. male who presents for Follow-up (Patient presented today for a 1 month follow up. /).    HPI   Patient states that he has been feeling good!  Maximizing diet and exercise.  Continued modest weight loss.  ENDOCRINE with no polyuria, polydipsia, polyphagia.  No blurred vision.  No skin, hair, nail changes.  No dramatic weight loss or weight gain.      No particular complaints.  Perhaps dry mouth, but no abdominal distress, no constipation.  No palpitations, dizziness, diaphoresis.  No zoe substernal chest pain, no orthopnea, no paroxysmal nocturnal dyspnea.  No particular cough or sputum production.  CONSTITUTIONALLY, no fever, no chills.  No night sweats.  No lingering anorexia or nausea.  No apparent lymphadenopathy.  No apparent weight loss.        Review of Systems  Review of systems as in history of present illness, and otherwise, reviewed separately as well, and was unremarkable/negative/noncontributory.        Objective   /82 (BP Location: Left arm, Patient Position: Sitting, BP Cuff Size: Adult)   Pulse 76   Ht 1.727 m (5' 8\")   Wt 91.2 kg (201 lb)   SpO2 95%   BMI 30.56 kg/m²     Physical Exam  In good spirits.  Not in distress or diaphoresis.  Alert, oriented x 3.  Amiable.  Not unkempt.  Receptive, cheerful, appropriate, and eager to maintain and hopefully improve quality of life.  Does not wish harm to self or others.  Appropriate.    HEAD pink palpebral conjunctivae, anicteric sclerae.  Mucous membranes moist.  NECK supple, no apparent jugular venous distention.  No carotid bruit.  CARDIOVASCULAR not in distress or diaphoresis.  No bipedal edema.  Regular rate and rhythm.  No murmurs appreciated.  LUNGS not in distress or diaphoresis.  Not using accessory muscles.  Clear to auscultation bilaterally.  ABDOMEN soft, nontender.  BACK no costovertebral angle tenderness.  EXTREMITIES no clubbing, no cyanosis.  NEURO no facial asymmetry.  No " apparent cranial nerve deficits.  Romberg negative.  Ambulating without need of assistance.  No apparent focal weakness.  No tremors.  PSYCH receptive, appropriate, and eager to maintain and improve quality of life.        LABORATORY results reviewed, hemoglobin A1c 5.6, body mass index 30.56, with continued weight loss.  Vitamin D 26.  LDL cholesterol 89, ASCVD risk 2.4%.  Preserved liver and kidney function noted.        Assessment/Plan   Diagnoses and all orders for this visit:  Class 1 obesity due to excess calories with serious comorbidity and body mass index (BMI) of 30.0 to 30.9 in adult  -     phentermine (Adipex-P) 37.5 mg tablet; Please take 1 tablet by mouth with breakfast, no later.  Lots of fluids throughout the day.  Thank you.  -     Follow Up In Primary Care - hospitals; Future  Mixed hyperlipidemia  -     phentermine (Adipex-P) 37.5 mg tablet; Please take 1 tablet by mouth with breakfast, no later.  Lots of fluids throughout the day.  Thank you.  -     Follow Up In Primary Care - hospitals; Future  Hyperglycemia  -     phentermine (Adipex-P) 37.5 mg tablet; Please take 1 tablet by mouth with breakfast, no later.  Lots of fluids throughout the day.  Thank you.  -     Follow Up In Primary Care - hospitals; Future  [place] cardiac risk <10% in next 10 years  -     Follow Up In Primary Care - Established  -     phentermine (Adipex-P) 37.5 mg tablet; Please take 1 tablet by mouth with breakfast, no later.  Lots of fluids throughout the day.  Thank you.  -     Follow Up In Primary Care - hospitals; Future  Essential hypertension  -     Follow Up In Primary [facility]; Future  Seborrheic keratosis  -     Follow Up In Primary Care [facility]; Future  Screening for colon cancer  -     Follow Up In Primary Care - Established; Future       Thank you very much for coming.  I am always very happy to see you!    Your blood pressure is controlled.  Your heart rate is good.  Please continue  taking your LOSARTAN as well as your METOPROLOL.  Yes, in the future, especially with weight loss, you may outgrow your blood pressure regimens!    Please continue to drink lots of fluids throughout the day, and please continue to avoid salt.  Please continue to stay physically active with lots of aerobic activities like brisk walking.  All of these contribute to better blood pressure readings!    In the meantime, you continue to slowly lose weight, average for pounds in 1 month.  This is good.  Please continue to maximize DIET.  [place] diet book, DASH diet, Mediterranean diet for ideas.  Please continue to maximize physical activity, especially aerobic activities like BRISK WALKING.    Please do not stay up late, because your phentermine/Adipex wears out by then, and you will have a surge in your appetite!  Please continue to take your phentermine/Adipex with BREAKFAST, no later.  Please continue to drink lots of fluids throughout the day.    Please call me after 1 month for refill of your phentermine/Adipex.  Be ready to give me the latest blood pressure, heart rate, and weight that you have, so that we can update your chart.  Do the same after 2 months.    Please come back in 3 months.  Until then, please continue to take care of yourself and your family, and please continue to pray for our recovery from this pandemic.    Please check out [email or web address].  I hope you have a happy Easter!            0  Return in 3 months, but to call next month for phentermine/Adipex No. 4, and again phentermine/Adipex No. 5 another month later.  Reassess hemodynamics, cardiovascular risk, weight loss efforts, mood, energy, function, preventive strategies, cardiovascular risk.            0  The patient is without insurance at this time, but will look for coverage, because he does understand that it will be more expensive in the long run if he or any and his family gets sick.  He will check out healthcare.gov.  Until then, we  will try to be a little bit more conservative, but careful.            0  Consider repeat fasting laboratory examinations if financially feasible for next visit.            0

## 2025-04-14 ENCOUNTER — APPOINTMENT (OUTPATIENT)
Dept: PRIMARY CARE | Facility: CLINIC | Age: 50
End: 2025-04-14
Payer: COMMERCIAL

## 2025-06-12 DIAGNOSIS — I10 ESSENTIAL HYPERTENSION: ICD-10-CM

## 2025-06-13 RX ORDER — LOSARTAN POTASSIUM 25 MG/1
25 TABLET ORAL
Qty: 90 TABLET | Refills: 0 | Status: SHIPPED | OUTPATIENT
Start: 2025-06-13

## 2025-06-27 DIAGNOSIS — I10 ESSENTIAL HYPERTENSION: ICD-10-CM

## 2025-06-27 RX ORDER — METOPROLOL SUCCINATE 25 MG/1
TABLET, EXTENDED RELEASE ORAL
Qty: 18 TABLET | Refills: 1 | Status: SHIPPED | OUTPATIENT
Start: 2025-06-27

## 2025-07-07 ENCOUNTER — APPOINTMENT (OUTPATIENT)
Dept: PRIMARY CARE | Facility: CLINIC | Age: 50
End: 2025-07-07

## 2025-08-06 ENCOUNTER — APPOINTMENT (OUTPATIENT)
Dept: PRIMARY CARE | Facility: CLINIC | Age: 50
End: 2025-08-06

## 2025-08-14 ENCOUNTER — APPOINTMENT (OUTPATIENT)
Dept: PRIMARY CARE | Facility: CLINIC | Age: 50
End: 2025-08-14